# Patient Record
Sex: MALE | Race: WHITE | NOT HISPANIC OR LATINO | Employment: UNEMPLOYED | URBAN - NONMETROPOLITAN AREA
[De-identification: names, ages, dates, MRNs, and addresses within clinical notes are randomized per-mention and may not be internally consistent; named-entity substitution may affect disease eponyms.]

---

## 2019-09-20 ENCOUNTER — LAB (OUTPATIENT)
Dept: LAB | Facility: HOSPITAL | Age: 59
End: 2019-09-20

## 2019-09-20 ENCOUNTER — APPOINTMENT (OUTPATIENT)
Dept: LAB | Facility: HOSPITAL | Age: 59
End: 2019-09-20

## 2019-09-20 ENCOUNTER — TRANSCRIBE ORDERS (OUTPATIENT)
Dept: ADMINISTRATIVE | Facility: HOSPITAL | Age: 59
End: 2019-09-20

## 2019-09-20 DIAGNOSIS — R30.0 DYSURIA: ICD-10-CM

## 2019-09-20 DIAGNOSIS — R30.0 DYSURIA: Primary | ICD-10-CM

## 2019-09-20 LAB
BILIRUB UR QL STRIP: NEGATIVE
CLARITY UR: CLEAR
COLOR UR: YELLOW
GLUCOSE UR STRIP-MCNC: NEGATIVE MG/DL
HGB UR QL STRIP.AUTO: NEGATIVE
KETONES UR QL STRIP: NEGATIVE
LEUKOCYTE ESTERASE UR QL STRIP.AUTO: NEGATIVE
NITRITE UR QL STRIP: NEGATIVE
PH UR STRIP.AUTO: 6 [PH] (ref 5–8)
PROT UR QL STRIP: NEGATIVE
SP GR UR STRIP: <=1.005 (ref 1–1.03)
UROBILINOGEN UR QL STRIP: NORMAL

## 2019-09-20 PROCEDURE — 81003 URINALYSIS AUTO W/O SCOPE: CPT

## 2020-07-03 ENCOUNTER — LAB (OUTPATIENT)
Dept: LAB | Facility: HOSPITAL | Age: 60
End: 2020-07-03

## 2020-07-03 ENCOUNTER — TRANSCRIBE ORDERS (OUTPATIENT)
Dept: LAB | Facility: HOSPITAL | Age: 60
End: 2020-07-03

## 2020-07-03 DIAGNOSIS — I10 ESSENTIAL (PRIMARY) HYPERTENSION: ICD-10-CM

## 2020-07-03 DIAGNOSIS — I10 ESSENTIAL (PRIMARY) HYPERTENSION: Primary | ICD-10-CM

## 2020-07-03 LAB
ALBUMIN SERPL-MCNC: 3.7 G/DL (ref 3.5–5)
ALBUMIN/GLOB SERPL: 1.2 G/DL (ref 1.1–2.5)
ALP SERPL-CCNC: 83 U/L (ref 24–120)
ALT SERPL W P-5'-P-CCNC: 29 U/L (ref 0–50)
ANION GAP SERPL CALCULATED.3IONS-SCNC: 5 MMOL/L (ref 4–13)
AST SERPL-CCNC: 29 U/L (ref 7–45)
AUTO MIXED CELLS #: 0.9 10*3/MM3 (ref 0.1–2.6)
AUTO MIXED CELLS %: 14.6 % (ref 0.1–24)
BILIRUB SERPL-MCNC: 0.4 MG/DL (ref 0.1–1)
BUN SERPL-MCNC: 7 MG/DL (ref 5–21)
BUN/CREAT SERPL: 8
CALCIUM SPEC-SCNC: 8.9 MG/DL (ref 8.4–10.4)
CHLORIDE SERPL-SCNC: 103 MMOL/L (ref 98–110)
CHOLEST SERPL-MCNC: 119 MG/DL (ref 130–200)
CO2 SERPL-SCNC: 30 MMOL/L (ref 24–31)
CREAT SERPL-MCNC: 0.87 MG/DL (ref 0.5–1.4)
ERYTHROCYTE [DISTWIDTH] IN BLOOD BY AUTOMATED COUNT: 12.9 % (ref 12.3–15.4)
GFR SERPL CREATININE-BSD FRML MDRD: 90 ML/MIN/1.73
GLOBULIN UR ELPH-MCNC: 3.1 GM/DL
GLUCOSE SERPL-MCNC: 107 MG/DL (ref 70–100)
HBA1C MFR BLD: 5.6 % (ref 4.8–5.9)
HCT VFR BLD AUTO: 36.1 % (ref 37.5–51)
HDLC SERPL-MCNC: 35 MG/DL
HGB BLD-MCNC: 12.9 G/DL (ref 13–17.7)
LDLC SERPL CALC-MCNC: 59 MG/DL (ref 0–99)
LDLC/HDLC SERPL: 1.69 {RATIO}
LYMPHOCYTES # BLD AUTO: 2.6 10*3/MM3 (ref 0.7–3.1)
LYMPHOCYTES NFR BLD AUTO: 44.1 % (ref 19.6–45.3)
MCH RBC QN AUTO: 31.7 PG (ref 26.6–33)
MCHC RBC AUTO-ENTMCNC: 35.7 G/DL (ref 31.5–35.7)
MCV RBC AUTO: 88.7 FL (ref 79–97)
NEUTROPHILS NFR BLD AUTO: 2.5 10*3/MM3 (ref 1.7–7)
NEUTROPHILS NFR BLD AUTO: 41.3 % (ref 42.7–76)
PLATELET # BLD AUTO: 270 10*3/MM3 (ref 140–450)
PMV BLD AUTO: 8 FL (ref 6–12)
POTASSIUM SERPL-SCNC: 4.3 MMOL/L (ref 3.5–5.3)
PROT SERPL-MCNC: 6.8 G/DL (ref 6.3–8.7)
PSA SERPL-MCNC: 0.46 NG/ML (ref 0–4)
RBC # BLD AUTO: 4.07 10*6/MM3 (ref 4.14–5.8)
SODIUM SERPL-SCNC: 138 MMOL/L (ref 135–145)
TRIGL SERPL-MCNC: 124 MG/DL (ref 0–149)
TSH SERPL DL<=0.05 MIU/L-ACNC: 1.32 UIU/ML (ref 0.27–4.2)
VLDLC SERPL-MCNC: 24.8 MG/DL
WBC # BLD AUTO: 6 10*3/MM3 (ref 3.4–10.8)

## 2020-07-03 PROCEDURE — G0103 PSA SCREENING: HCPCS | Performed by: NURSE PRACTITIONER

## 2020-07-03 PROCEDURE — 83036 HEMOGLOBIN GLYCOSYLATED A1C: CPT

## 2020-07-03 PROCEDURE — 36415 COLL VENOUS BLD VENIPUNCTURE: CPT

## 2020-07-03 PROCEDURE — 80061 LIPID PANEL: CPT

## 2020-07-03 PROCEDURE — 80050 GENERAL HEALTH PANEL: CPT

## 2020-07-06 ENCOUNTER — TELEPHONE (OUTPATIENT)
Dept: VASCULAR SURGERY | Facility: CLINIC | Age: 60
End: 2020-07-06

## 2020-07-28 RX ORDER — LISINOPRIL 40 MG/1
40 TABLET ORAL EVERY MORNING
COMMUNITY
Start: 2018-10-10 | End: 2021-03-26 | Stop reason: SDUPTHER

## 2020-07-28 RX ORDER — AMANTADINE HYDROCHLORIDE 100 MG/1
CAPSULE, GELATIN COATED ORAL
COMMUNITY
Start: 2020-07-03 | End: 2021-03-26

## 2020-07-28 RX ORDER — PANTOPRAZOLE SODIUM 40 MG/1
40 TABLET, DELAYED RELEASE ORAL EVERY MORNING
COMMUNITY
Start: 2020-07-03

## 2020-07-28 RX ORDER — PROPRANOLOL HYDROCHLORIDE 20 MG/1
20 TABLET ORAL 3 TIMES DAILY
COMMUNITY
Start: 2020-07-03

## 2020-07-28 RX ORDER — BENZTROPINE MESYLATE 2 MG/1
TABLET ORAL
COMMUNITY
End: 2021-03-26

## 2020-07-28 RX ORDER — CHOLECALCIFEROL (VITAMIN D3) 125 MCG
TABLET ORAL
COMMUNITY
End: 2021-03-26

## 2020-07-28 RX ORDER — FLUOXETINE HYDROCHLORIDE 40 MG/1
40 CAPSULE ORAL EVERY MORNING
COMMUNITY
Start: 2020-07-03

## 2020-07-28 RX ORDER — CLONAZEPAM 0.5 MG/1
0.5 TABLET ORAL 2 TIMES DAILY PRN
COMMUNITY
Start: 2020-07-03

## 2020-08-11 NOTE — PROGRESS NOTES
[facility] - PODIATRY    Today's Date: 08/18/20    Patient Name: Ryan Osborn  MRN: [id number]  CSN: [id number]  PCP: Sebastián Monte MD  Referring Provider: Margot Grissom APRN    SUBJECTIVE     Chief Complaint   Patient presents with   • Establish Care     pt c/o long, thickened toenails,  pt stated pain when walking bare foot, denies pain at present time - PCP last visit 7/3/20 non-diabetic      HPI: Ryan Osborn, a 60 y.o.male, comes to clinic as a(n) new patient complaining of thick fungal toenails. Patient has h/o Schizophrenia, Bipolar 1 Disorder, Depression, Anxiety, HTN. Patient is non-diabetic. Patient's brother partially gives history. Recently moved in with his brother to assist with care. Notes that his toenails were very long, thick, discolored and crumbly. Patient has difficulty caring for himself due to psychological issues. Denies pain. Denies previous treatment. Denies any constitutional symptoms. No other pedal complaints at this time.    Past Medical History:   Diagnosis Date   • Anxiety and depression    • Bipolar 1 disorder (CMS/HCC)    • Depression    • Hypertension    • Schizophrenia (CMS/HCC)      Past Surgical History:   Procedure Laterality Date   • APPENDECTOMY     • HERNIA REPAIR       Family History   Problem Relation Age of Onset   • Colon polyps Brother    • Colon cancer Neg Hx    • Esophageal cancer Neg Hx      Social History     Socioeconomic History   • Marital status:      Spouse name: Not on file   • Number of children: Not on file   • Years of education: Not on file   • Highest education level: Not on file   Tobacco Use   • Smoking status: Never Smoker   • Smokeless tobacco: Never Used   Substance and Sexual Activity   • Alcohol use: Never     Frequency: Never   • Drug use: Never     Allergies   Allergen Reactions   • Aripiprazole Other (See Comments)     Caused pacing     Current Outpatient Medications   Medication Sig Dispense Refill   •  amantadine (SYMMETREL) 100 MG capsule      • benztropine (COGENTIN) 2 MG tablet benztropine 2 mg tablet   Take 1 tablet twice a day by oral route.     • clonazePAM (KlonoPIN) 0.5 MG tablet      • FLUoxetine (PROzac) 40 MG capsule      • hydrOXYzine pamoate (VISTARIL) 25 MG capsule hydroxyzine pamoate 25 mg capsule     • lisinopril (PRINIVIL,ZESTRIL) 40 MG tablet Take 1 tablet(s) every day by oral route for 30 days.     • paliperidone (INVEGA) 6 MG 24 hr tablet paliperidone     • pantoprazole (Protonix) 40 MG EC tablet Take 1 tablet every day by oral route in the morning.     • propranolol (INDERAL) 20 MG tablet      • Ergocalciferol (VITAMIN D2) 50 MCG (2000 UT) tablet Take  by mouth.     • terbinafine (lamISIL) 1 % cream Apply  topically to the appropriate area as directed 2 (Two) Times a Day. 36 g 5     No current facility-administered medications for this visit.      Review of Systems   Constitutional: Negative for chills and fever.   HENT: Negative for congestion.    Respiratory: Negative for shortness of breath.    Cardiovascular: Negative for chest pain and leg swelling.   Gastrointestinal: Negative for constipation, diarrhea, nausea and vomiting.   Musculoskeletal:        Foot pain   Skin: Negative for wound.   Neurological: Negative for numbness.       OBJECTIVE     Vitals:    08/18/20 1448   BP: 124/80   Pulse: 62   SpO2: 95%       PHYSICAL EXAM  GEN:   Accompanied by brother.     Foot/Ankle Exam:       General:   Orientation: AAOx3    Gait: unimpaired    Assistance: independent    Shoe Gear:  Casual shoes    VASCULAR      Right Foot Vascularity   Dorsalis pedis:  2+  Posterior tibial:  2+  Skin Temperature: warm    Edema Grading:  None  CFT:  3  Pedal Hair Growth:  Present  Varicosities: none       Left Foot Vascularity   Dorsalis pedis:  2+  Posterior tibial:  2+  Skin Temperature: warm    Edema Grading:  None  CFT:  3  Pedal Hair Growth:  Present  Varicosities: none        NEUROLOGIC     Right Foot  Neurologic   Normal sensation    Light touch sensation:  Normal  Vibratory sensation:  Normal  Hot/Cold sensation: normal    Protective Sensation using Walker-Marlen Monofilament:  10     Left Foot Neurologic   Normal sensation    Light touch sensation:  Normal  Vibratory sensation:  Normal  Hot/cold sensation: normal    Protective Sensation using Walker-Marlen Monofilament:  10     MUSCULOSKELETAL      Right Foot Musculoskeletal   Ecchymosis:  None  Tenderness: toenails    Arch:  Normal     Left Foot Musculoskeletal   Ecchymosis:  None  Tenderness: toenails    Arch:  Normal     MUSCLE STRENGTH     Right Foot Muscle Strength   Foot dorsiflexion:  5  Foot plantar flexion:  5  Foot inversion:  5  Foot eversion:  5     Left Foot Muscle Strength   Foot dorsiflexion:  5  Foot plantar flexion:  5  Foot inversion:  5  Foot eversion:  5     RANGE OF MOTION      Right Foot Range of Motion   Foot and ankle ROM within normal limits       Left Foot Range of Motion   Foot and ankle ROM within normal limits       DERMATOLOGIC     Right Foot Dermatologic   Skin: tinea (moccasin distribution)    Nails: onychomycosis, abnormally thick, subungual debris and dystrophic nails       Left Foot Dermatologic   Skin: tinea (moccasin distribution)    Nails: onychomycosis, abnormally thick, subungual debris and dystrophic nails        RADIOLOGY/NUCLEAR:  No results found.    LABORATORY/CULTURE RESULTS:      PATHOLOGY RESULTS:       ASSESSMENT/PLAN     Ryan was seen today for establish care.    Diagnoses and all orders for this visit:    Onychomycosis    Tinea pedis of both feet    Other orders  -     terbinafine (lamISIL) 1 % cream; Apply  topically to the appropriate area as directed 2 (Two) Times a Day.      Comprehensive lower extremity examination and evaluation was performed.  Discussed findings and treatment plan including risks, benefits, and treatment options with patient in detail. Patient agreed with treatment plan.  After  verbal consent obtained, nail(s) x10 debrided of length and thickness with nail nipper without incidence  Patient may maintain nails and calluses at home utilizing emery board or pumice stone between visits as needed   Apply antifungal BID until reoslution   An After Visit Summary was printed and given to the patient at discharge, including (if requested) any available informative/educational handouts regarding diagnosis, treatment, or medications. All questions were answered to patient/family satisfaction. Should symptoms fail to improve or worsen they agree to call or return to clinic or to go to the Emergency Department. Discussed the importance of following up with any needed screening tests/labs/specialist appointments and any requested follow-up recommended by me today. Importance of maintaining follow-up discussed and patient accepts that missed appointments can delay diagnosis and potentially lead to worsening of conditions.  Return if symptoms worsen or fail to improve., or sooner if acute issues arise.        This document has been electronically signed by Wilmer Mcclain DPM on August 18, 2020 15:18

## 2020-08-13 ENCOUNTER — OFFICE VISIT (OUTPATIENT)
Dept: GASTROENTEROLOGY | Facility: CLINIC | Age: 60
End: 2020-08-13

## 2020-08-13 VITALS
BODY MASS INDEX: 30.61 KG/M2 | HEIGHT: 72 IN | TEMPERATURE: 97 F | OXYGEN SATURATION: 98 % | DIASTOLIC BLOOD PRESSURE: 76 MMHG | WEIGHT: 226 LBS | SYSTOLIC BLOOD PRESSURE: 134 MMHG | HEART RATE: 71 BPM

## 2020-08-13 DIAGNOSIS — R13.10 ODYNOPHAGIA: Primary | ICD-10-CM

## 2020-08-13 PROCEDURE — 99204 OFFICE O/P NEW MOD 45 MIN: CPT | Performed by: INTERNAL MEDICINE

## 2020-08-13 RX ORDER — PALIPERIDONE 6 MG/1
6 TABLET, EXTENDED RELEASE ORAL EVERY MORNING
COMMUNITY

## 2020-08-13 NOTE — PROGRESS NOTES
Chief Complaint   Patient presents with   • GI Problem     when he eats he gets choked       PCP: Sebastián Monte MD  REFER: Margot Grissom APRN    Subjective     HPI    He will often cough while eating.  He feels discomfort when swallowing.  He notices phlegm.    No weight  Loss, reports as weight  gain.  No bowels changes.  No heartburn or indigestion.  Started on protonix by PCP.  Unsure if this has provided relief.  Colonoscopy by dr Wicho Carranza 2017.  Consumes large amount of caffeine and chocolate milk.    History reviewed. No pertinent past medical history.    Past Surgical History:   Procedure Laterality Date   • APPENDECTOMY     • HERNIA REPAIR         Outpatient Medications Marked as Taking for the 8/13/20 encounter (Office Visit) with Xavier Stone, DO   Medication Sig Dispense Refill   • amantadine (SYMMETREL) 100 MG capsule      • clonazePAM (KlonoPIN) 0.5 MG tablet      • FLUoxetine (PROzac) 40 MG capsule      • lisinopril (PRINIVIL,ZESTRIL) 40 MG tablet Take 1 tablet(s) every day by oral route for 30 days.     • paliperidone (INVEGA) 6 MG 24 hr tablet paliperidone     • pantoprazole (Protonix) 40 MG EC tablet Take 1 tablet every day by oral route in the morning.     • propranolol (INDERAL) 20 MG tablet          Allergies   Allergen Reactions   • Aripiprazole Other (See Comments)     Caused pacing       Social History     Socioeconomic History   • Marital status:      Spouse name: Not on file   • Number of children: Not on file   • Years of education: Not on file   • Highest education level: Not on file   Tobacco Use   • Smoking status: Never Smoker   • Smokeless tobacco: Never Used   Substance and Sexual Activity   • Alcohol use: Never     Frequency: Never   • Drug use: Never       Family History   Problem Relation Age of Onset   • Colon polyps Brother    • Colon cancer Neg Hx    • Esophageal cancer Neg Hx        Review of Systems   Constitutional: Negative for fatigue, fever and  "unexpected weight change.   HENT: Positive for trouble swallowing. Negative for hearing loss, sore throat and voice change.    Eyes: Negative for visual disturbance.   Respiratory: Negative for cough, shortness of breath and wheezing.    Cardiovascular: Negative for chest pain and palpitations.   Gastrointestinal: Negative for abdominal pain, blood in stool and vomiting.   Endocrine: Negative for polydipsia and polyuria.   Genitourinary: Negative for difficulty urinating, dysuria, hematuria and urgency.   Musculoskeletal: Negative for joint swelling and myalgias.   Skin: Negative for color change, rash and wound.   Neurological: Negative for dizziness, tremors, seizures and syncope.   Hematological: Does not bruise/bleed easily.   Psychiatric/Behavioral: Negative for agitation and confusion. The patient is not nervous/anxious.        Objective     Vitals:    08/13/20 1420   BP: 134/76   Pulse: 71   Temp: 97 °F (36.1 °C)   SpO2: 98%   Weight: 103 kg (226 lb)   Height: 182.9 cm (72\")     Body mass index is 30.65 kg/m².    Physical Exam   Constitutional: He is oriented to person, place, and time. He appears well-developed and well-nourished. He is cooperative.   HENT:   Head: Normocephalic and atraumatic.   Eyes: Pupils are equal, round, and reactive to light. Conjunctivae are normal. No scleral icterus.   Neck: Normal range of motion. Neck supple. No JVD present. No thyroid mass and no thyromegaly present.   Cardiovascular: Normal rate, regular rhythm and normal heart sounds. Exam reveals no gallop and no friction rub.   No murmur heard.  Pulmonary/Chest: Effort normal and breath sounds normal. No accessory muscle usage. No respiratory distress. He has no wheezes. He has no rales.   Abdominal: Soft. Normal appearance and bowel sounds are normal. He exhibits no distension, no ascites and no mass. There is no hepatosplenomegaly. There is no tenderness. There is no rebound and no guarding.   Musculoskeletal: Normal range " of motion. He exhibits no edema or tenderness.     Vascular Status -  His right foot exhibits normal foot vasculature  and no edema. His left foot exhibits normal foot vasculature  and no edema.  Lymphadenopathy:     He has no cervical adenopathy.   Neurological: He is alert and oriented to person, place, and time. He has normal strength. Gait normal.   Skin: Skin is warm, dry and intact. No rash noted.       Imaging Results (Most Recent)     None          Body mass index is 30.65 kg/m².    Assessment/Plan     Ryan was seen today for gi problem.    Diagnoses and all orders for this visit:    Odynophagia  -     Case Request; Standing  -     Case Request        ESOPHAGOGASTRODUODENOSCOPY WITH ANESTHESIA (N/A)    Cautioned against caffeine consumption    Precautions are currently being put in place due to COVID-19.  I have explained to Ryan Osborn they will be required to undergo COVID testing prior to their procedure.  Ryan Osborn verbalized understanding and was willing to proceed.    Patient's Body mass index is 30.65 kg/m². BMI is above normal parameters. Recommendations include: no follow up.       Xavier Stone DO  08/13/20          There are no Patient Instructions on file for this visit.

## 2020-08-17 ENCOUNTER — TELEPHONE (OUTPATIENT)
Dept: PODIATRY | Facility: CLINIC | Age: 60
End: 2020-08-17

## 2020-08-18 ENCOUNTER — OFFICE VISIT (OUTPATIENT)
Dept: PODIATRY | Facility: CLINIC | Age: 60
End: 2020-08-18

## 2020-08-18 VITALS
WEIGHT: 224 LBS | BODY MASS INDEX: 30.34 KG/M2 | DIASTOLIC BLOOD PRESSURE: 80 MMHG | SYSTOLIC BLOOD PRESSURE: 124 MMHG | HEIGHT: 72 IN | HEART RATE: 62 BPM | OXYGEN SATURATION: 95 %

## 2020-08-18 DIAGNOSIS — B35.3 TINEA PEDIS OF BOTH FEET: ICD-10-CM

## 2020-08-18 DIAGNOSIS — B35.1 ONYCHOMYCOSIS: Primary | ICD-10-CM

## 2020-08-18 PROCEDURE — 11721 DEBRIDE NAIL 6 OR MORE: CPT | Performed by: PODIATRIST

## 2020-08-18 PROCEDURE — 99203 OFFICE O/P NEW LOW 30 MIN: CPT | Performed by: PODIATRIST

## 2020-08-18 RX ORDER — HYDROXYZINE PAMOATE 25 MG/1
CAPSULE ORAL
COMMUNITY
End: 2021-03-26

## 2020-08-20 ENCOUNTER — TRANSCRIBE ORDERS (OUTPATIENT)
Dept: ADMINISTRATIVE | Facility: HOSPITAL | Age: 60
End: 2020-08-20

## 2020-08-20 DIAGNOSIS — Z01.818 PRE-OP TESTING: Primary | ICD-10-CM

## 2020-08-27 ENCOUNTER — TELEPHONE (OUTPATIENT)
Dept: GASTROENTEROLOGY | Facility: CLINIC | Age: 60
End: 2020-08-27

## 2020-08-27 NOTE — TELEPHONE ENCOUNTER
Patient did not get COVID testing done. Reschedule for 09/04/2020 at 6:45am     Spoke with patient and his brother and they are now both aware that he must get tested for Covid19 before the procedure.     Thank you

## 2020-09-01 ENCOUNTER — LAB (OUTPATIENT)
Dept: LAB | Facility: HOSPITAL | Age: 60
End: 2020-09-01

## 2020-09-01 PROCEDURE — C9803 HOPD COVID-19 SPEC COLLECT: HCPCS | Performed by: INTERNAL MEDICINE

## 2020-09-01 PROCEDURE — U0003 INFECTIOUS AGENT DETECTION BY NUCLEIC ACID (DNA OR RNA); SEVERE ACUTE RESPIRATORY SYNDROME CORONAVIRUS 2 (SARS-COV-2) (CORONAVIRUS DISEASE [COVID-19]), AMPLIFIED PROBE TECHNIQUE, MAKING USE OF HIGH THROUGHPUT TECHNOLOGIES AS DESCRIBED BY CMS-2020-01-R: HCPCS | Performed by: INTERNAL MEDICINE

## 2020-09-02 LAB
COVID LABCORP PRIORITY: NORMAL
SARS-COV-2 RNA RESP QL NAA+PROBE: NOT DETECTED

## 2020-09-04 ENCOUNTER — HOSPITAL ENCOUNTER (OUTPATIENT)
Facility: HOSPITAL | Age: 60
Setting detail: HOSPITAL OUTPATIENT SURGERY
Discharge: HOME | End: 2020-09-04
Attending: INTERNAL MEDICINE | Admitting: INTERNAL MEDICINE

## 2020-09-04 ENCOUNTER — ANESTHESIA (OUTPATIENT)
Dept: GASTROENTEROLOGY | Facility: HOSPITAL | Age: 60
End: 2020-09-04

## 2020-09-04 ENCOUNTER — ANESTHESIA EVENT (OUTPATIENT)
Dept: GASTROENTEROLOGY | Facility: HOSPITAL | Age: 60
End: 2020-09-04

## 2020-09-04 VITALS
HEART RATE: 60 BPM | RESPIRATION RATE: 14 BRPM | HEIGHT: 75 IN | BODY MASS INDEX: 27.85 KG/M2 | SYSTOLIC BLOOD PRESSURE: 154 MMHG | OXYGEN SATURATION: 97 % | DIASTOLIC BLOOD PRESSURE: 99 MMHG | TEMPERATURE: 98.2 F | WEIGHT: 224 LBS

## 2020-09-04 DIAGNOSIS — R13.10 ODYNOPHAGIA: ICD-10-CM

## 2020-09-04 PROCEDURE — 25010000002 PROPOFOL 10 MG/ML EMULSION: Performed by: NURSE ANESTHETIST, CERTIFIED REGISTERED

## 2020-09-04 PROCEDURE — 43239 EGD BIOPSY SINGLE/MULTIPLE: CPT | Performed by: INTERNAL MEDICINE

## 2020-09-04 PROCEDURE — 87081 CULTURE SCREEN ONLY: CPT | Performed by: INTERNAL MEDICINE

## 2020-09-04 RX ORDER — SODIUM CHLORIDE 9 MG/ML
500 INJECTION, SOLUTION INTRAVENOUS CONTINUOUS PRN
Status: DISCONTINUED | OUTPATIENT
Start: 2020-09-04 | End: 2020-09-04 | Stop reason: HOSPADM

## 2020-09-04 RX ORDER — LIDOCAINE HYDROCHLORIDE 10 MG/ML
0.5 INJECTION, SOLUTION EPIDURAL; INFILTRATION; INTRACAUDAL; PERINEURAL ONCE AS NEEDED
Status: DISCONTINUED | OUTPATIENT
Start: 2020-09-04 | End: 2020-09-04 | Stop reason: HOSPADM

## 2020-09-04 RX ORDER — SODIUM CHLORIDE 0.9 % (FLUSH) 0.9 %
10 SYRINGE (ML) INJECTION AS NEEDED
Status: DISCONTINUED | OUTPATIENT
Start: 2020-09-04 | End: 2020-09-04 | Stop reason: HOSPADM

## 2020-09-04 RX ORDER — PROPOFOL 10 MG/ML
VIAL (ML) INTRAVENOUS AS NEEDED
Status: DISCONTINUED | OUTPATIENT
Start: 2020-09-04 | End: 2020-09-04 | Stop reason: SURG

## 2020-09-04 RX ADMIN — PROPOFOL 30 MG: 10 INJECTION, EMULSION INTRAVENOUS at 08:23

## 2020-09-04 RX ADMIN — SODIUM CHLORIDE 500 ML: 9 INJECTION, SOLUTION INTRAVENOUS at 07:53

## 2020-09-04 RX ADMIN — LIDOCAINE HYDROCHLORIDE 100 MG: 20 INJECTION, SOLUTION INTRAVENOUS at 08:18

## 2020-09-04 RX ADMIN — PROPOFOL 30 MG: 10 INJECTION, EMULSION INTRAVENOUS at 08:19

## 2020-09-04 RX ADMIN — PROPOFOL 70 MG: 10 INJECTION, EMULSION INTRAVENOUS at 08:18

## 2020-09-04 RX ADMIN — PROPOFOL 30 MG: 10 INJECTION, EMULSION INTRAVENOUS at 08:21

## 2020-09-04 NOTE — ANESTHESIA POSTPROCEDURE EVALUATION
Patient: Ryan Osborn    Procedure Summary     Date:  09/04/20 Room / Location:  [facility] ENDOSCOPY 5 / [facility]    Anesthesia Start:  0816 Anesthesia Stop:  0827    Procedure:  ESOPHAGOGASTRODUODENOSCOPY WITH ANESTHESIA (N/A ) Diagnosis:       Odynophagia      (Odynophagia [R13.10])    Surgeon:  Xavier Stone DO Provider:  Rfaael Navarro CRNA    Anesthesia Type:  MAC ASA Status:  2          Anesthesia Type: MAC    Vitals  Vitals Value Taken Time   BP     Temp     Pulse 72 9/4/2020  8:28 AM   Resp     SpO2 95 % 9/4/2020  8:28 AM   Vitals shown include unvalidated device data.        Post Anesthesia Care and Evaluation    Patient location during evaluation: PHASE II  Patient participation: complete - patient participated  Level of consciousness: awake  Pain score: 0  Pain management: adequate  Airway patency: patent  Anesthetic complications: No anesthetic complications  PONV Status: none  Cardiovascular status: acceptable  Respiratory status: acceptable  Hydration status: acceptable

## 2020-09-04 NOTE — ANESTHESIA PREPROCEDURE EVALUATION
Anesthesia Evaluation     Patient summary reviewed   no history of anesthetic complications:  NPO Solid Status: > 8 hours             Airway   Mallampati: II  TM distance: >3 FB  Neck ROM: full  Dental      Pulmonary    (+) sleep apnea on CPAP,   Cardiovascular   Exercise tolerance: excellent (>7 METS)    (+) hypertension,       Neuro/Psych  (+) psychiatric history Schizophrenia,     GI/Hepatic/Renal/Endo    (+)  GERD,      Musculoskeletal     Abdominal    Substance History      OB/GYN          Other                        Anesthesia Plan    ASA 2     MAC       Anesthetic plan, all risks, benefits, and alternatives have been provided, discussed and informed consent has been obtained with: patient.

## 2020-09-05 LAB — UREASE TISS QL: NEGATIVE

## 2020-10-20 ENCOUNTER — OFFICE VISIT (OUTPATIENT)
Dept: GASTROENTEROLOGY | Facility: CLINIC | Age: 60
End: 2020-10-20

## 2020-10-20 VITALS
SYSTOLIC BLOOD PRESSURE: 136 MMHG | OXYGEN SATURATION: 98 % | HEIGHT: 72 IN | DIASTOLIC BLOOD PRESSURE: 80 MMHG | HEART RATE: 70 BPM | WEIGHT: 227 LBS | TEMPERATURE: 98.2 F | BODY MASS INDEX: 30.75 KG/M2

## 2020-10-20 DIAGNOSIS — K21.9 GASTROESOPHAGEAL REFLUX DISEASE WITHOUT ESOPHAGITIS: ICD-10-CM

## 2020-10-20 DIAGNOSIS — R05.9 COUGH IN ADULT PATIENT: Primary | ICD-10-CM

## 2020-10-20 PROCEDURE — 99213 OFFICE O/P EST LOW 20 MIN: CPT | Performed by: INTERNAL MEDICINE

## 2020-10-20 NOTE — PROGRESS NOTES
Chief Complaint   Patient presents with   • GI Problem     coughs when he drinks liquids       PCP: Sebastián Monte MD  REFER: No ref. provider found    Subjective     HPI    Since taking PO Protonix doing significantly better/but family member that's with him isn't totally convinced , brother who is with him still thinks he has a problem with coughing after drinking      Past Medical History:   Diagnosis Date   • Anxiety and depression    • Bipolar 1 disorder (CMS/HCC)    • Depression    • Hypertension    • Schizophrenia (CMS/HCC)        Past Surgical History:   Procedure Laterality Date   • APPENDECTOMY     • ENDOSCOPY N/A 9/4/2020    Procedure: ESOPHAGOGASTRODUODENOSCOPY WITH ANESTHESIA;  Surgeon: Xavier Stone DO;  Location: [facility] ENDOSCOPY;  Service: Gastroenterology;  Laterality: N/A;  pre: painful swallowing  post; esophagitis  Sebastián Monte MD   • HERNIA REPAIR         Outpatient Medications Marked as Taking for the 10/20/20 encounter (Office Visit) with Xavier Stone DO   Medication Sig Dispense Refill   • amantadine (SYMMETREL) 100 MG capsule      • benztropine (COGENTIN) 2 MG tablet benztropine 2 mg tablet   Take 1 tablet twice a day by oral route.     • clonazePAM (KlonoPIN) 0.5 MG tablet      • Ergocalciferol (VITAMIN D2) 50 MCG (2000 UT) tablet Take  by mouth.     • FLUoxetine (PROzac) 40 MG capsule      • hydrOXYzine pamoate (VISTARIL) 25 MG capsule hydroxyzine pamoate 25 mg capsule     • lisinopril (PRINIVIL,ZESTRIL) 40 MG tablet Take 1 tablet(s) every day by oral route for 30 days.     • paliperidone (INVEGA) 6 MG 24 hr tablet paliperidone     • pantoprazole (Protonix) 40 MG EC tablet Take 1 tablet every day by oral route in the morning.     • propranolol (INDERAL) 20 MG tablet      • terbinafine (lamISIL) 1 % cream Apply  topically to the appropriate area as directed 2 (Two) Times a Day. 36 g 5       Allergies   Allergen Reactions   • Aripiprazole Other (See Comments)     Caused pacing  "      Social History     Socioeconomic History   • Marital status:      Spouse name: Not on file   • Number of children: Not on file   • Years of education: Not on file   • Highest education level: Not on file   Tobacco Use   • Smoking status: Never Smoker   • Smokeless tobacco: Never Used   Substance and Sexual Activity   • Alcohol use: Never     Frequency: Never   • Drug use: Never       Family History   Problem Relation Age of Onset   • Colon polyps Brother    • Colon cancer Neg Hx    • Esophageal cancer Neg Hx        Review of Systems   Constitutional: Negative for fatigue, fever and unexpected weight change.   HENT: Negative for hearing loss, sore throat and voice change.    Eyes: Negative for visual disturbance.   Respiratory: Negative for cough, shortness of breath and wheezing.    Cardiovascular: Negative for chest pain and palpitations.   Gastrointestinal: Negative for abdominal pain, blood in stool and vomiting.   Endocrine: Negative for polydipsia and polyuria.   Genitourinary: Negative for difficulty urinating, dysuria, hematuria and urgency.   Musculoskeletal: Negative for joint swelling and myalgias.   Skin: Negative for color change, rash and wound.   Neurological: Negative for dizziness, tremors, seizures and syncope.   Hematological: Does not bruise/bleed easily.   Psychiatric/Behavioral: Negative for agitation and confusion. The patient is not nervous/anxious.        Objective     Vitals:    10/20/20 1251   BP: 136/80   Pulse: 70   Temp: 98.2 °F (36.8 °C)   SpO2: 98%   Weight: 103 kg (227 lb)   Height: 182.9 cm (72\")     Body mass index is 30.79 kg/m².    Physical Exam  Constitutional:       Appearance: He is well-developed.   HENT:      Head: Normocephalic and atraumatic.   Eyes:      Comments: Pink, Nonicteric   Neck:      Comments: Global Assessment- supple. No JVD or lymphadenopathy  Cardiovascular:      Rate and Rhythm: Normal rate and regular rhythm.      Heart sounds: Normal heart " sounds. No murmur. No friction rub. No gallop.    Pulmonary:      Effort: Pulmonary effort is normal. No respiratory distress.      Breath sounds: Normal breath sounds. No wheezing or rales.   Abdominal:      General: Bowel sounds are normal. There is no distension.      Palpations: Abdomen is soft. There is no mass.      Tenderness: There is no abdominal tenderness. There is no guarding or rebound.   Neurological:      Mental Status: He is alert and oriented to person, place, and time.      Comments: General Exam-Deemed a reliable historian, able to converse without difficulty and Able to move all extremities without difficulty         Imaging Results (Most Recent)     None          Body mass index is 30.79 kg/m².    Assessment/Plan     Diagnoses and all orders for this visit:    1. Cough in adult patient (Primary)  -     Ambulatory Referral to Speech Therapy    2. Gastroesophageal reflux disease without esophagitis        * Surgery not found *        Precautions are currently being put in place due to COVID-19.  I have explained to Ryan Osborn they will be required to undergo COVID testing prior to their procedure.  Ryan Osborn verbalized understanding and was willing to proceed.    Patient's Body mass index is 30.79 kg/m². BMI is within normal parameters. No follow-up required..       Xavier Stone, DO  10/20/20          There are no Patient Instructions on file for this visit.

## 2020-10-26 ENCOUNTER — TELEPHONE (OUTPATIENT)
Dept: GASTROENTEROLOGY | Facility: CLINIC | Age: 60
End: 2020-10-26

## 2020-10-26 DIAGNOSIS — R05.9 COUGH IN ADULT PATIENT: Primary | ICD-10-CM

## 2020-11-03 ENCOUNTER — OFFICE VISIT (OUTPATIENT)
Dept: PHYSICAL THERAPY | Facility: CLINIC | Age: 60
End: 2020-11-03

## 2020-11-03 DIAGNOSIS — R13.10 DYSPHAGIA, UNSPECIFIED TYPE: Primary | ICD-10-CM

## 2020-11-03 PROCEDURE — 92610 EVALUATE SWALLOWING FUNCTION: CPT | Performed by: SPEECH-LANGUAGE PATHOLOGIST

## 2020-11-03 NOTE — PROGRESS NOTES
Outpatient Speech Language Pathology   Adult Swallow Initial Evaluation       Patient Name: Ryan Osborn  : 1960  MRN: [id number]  Today's Date: 11/3/2020         Visit Date: 2020   Patient Active Problem List   Diagnosis   • Odynophagia        Past Medical History:   Diagnosis Date   • Anxiety and depression    • Bipolar 1 disorder (CMS/HCC)    • Depression    • Hypertension    • Schizophrenia (CMS/HCC)         Past Surgical History:   Procedure Laterality Date   • APPENDECTOMY     • ENDOSCOPY N/A 2020    Procedure: ESOPHAGOGASTRODUODENOSCOPY WITH ANESTHESIA;  Surgeon: Xavier Stone DO;  Location: [facility] ENDOSCOPY;  Service: Gastroenterology;  Laterality: N/A;  pre: painful swallowing  post; esophagitis  Sebastián Monte MD   • HERNIA REPAIR           Visit Dx:     ICD-10-CM ICD-9-CM   1. Dysphagia, unspecified type  R13.10 787.20       SPEECH-LANGUAGE PATHOLOGY EVALUATION - SWALLOW  Subjective: The patient was seen on this date for a Clinical Swallow evaluation.  Patient was alert and cooperative.  Significant history: GERD, c/o coughing with drinking liquids with meals. Poor dentition and poor oral care.   Objective: Textures given included thin liquid and regular consistency.  Assessment:  Patient presented with trials of thin water, cracker, and 3oz water swallow test ([place] swallow screen). Patient had no overt s/s of aspiration. SLP cannot r/o silent aspiration without instrumental evaluation. Patient has poor dentition and poor oral health. He stated that his teeth do not meet so he cannot chew. He plans to have his teeth pulled and get dentures. He stated that he has to drink water to make his food go down due to not being able to chew. This may be contributing to his coughing with drinking during meals. VFSS recommended to r/o aspiration and determine swallow physiology  SLP Findings:  Patient presents with suspected oropharyngeal dysphagia, with esophageal component.    Recommendations: Diet Textures: thin liquid, mechanical soft consistency food.  Medications should be taken as tolerated.   Recommended Strategies: Upright for PO, small bites and sips and alternate liquids and solids. Oral care before breakfast, after all meals and PRN. Follow up with dental professional.   Other Recommended Evaluations: VFSS    Dysphagia therapy pending results of VFSS.            Adult Dysphagia - 11/03/20 1500        Adult Dysphagia Background    Patient Description of Complaint  Trouble swallowing liquids.   -KG    Date of Onset   At least a year.    -KG    Symptoms Reported by Patient  Difficulty swallowing liquids   -KG    Patient Report of Functional Impact  Coughs with meals   -KG    List Pertinent Medications  see chart   -KG    History Pertinent to Diagnosis  GERD   -KG    Current Diet (Solids)  Mechanical Soft    due to dentition  -KG    Diet Level (Liquids)  Thin Liquid   -KG       Oral Mech    Position During Evaluation  Upright   -KG    Anatomy/Physiology WNL  Patient demonstrates anatomy that is WNL   -KG    Dentition  Patient is partially edentulous   -KG    Oral Health  Poor oral health    awaiting dental visit to pull remaining teeth.   -KG    Awareness/Control of Secretions  Patient swallows saliva   -KG       Foods/Liquids Presented    Method of Administration  Cup;Self-fed   -KG       Cup    Consistency  Thin   -KG    Amount  3oz water swallow plus sips   -KG    Response  No Signs/Symptoms   -KG       Self-Fed    Consistency  Soft Solid   -KG    Amount  1 bite cracker   -KG    Response  Oral Residue;Other (comment)    due to dentition, cannot chew  -KG    Strategies Attempted  Other (comment)    liquid wash  -KG    Response to Strategies  Successful   -KG       Oral Phase Impaired Physiology Observed    Oral Phase  X   -KG    Compensations  Attempted (comment)    cannot chew well due to dentition  -KG    Pharyngeal Symptoms  None observed   -KG    Summary Comments of Clinical  Exam  Patient presented with trials of thin water, cracker, and 3oz water swallow test ([place] swallow screen). Patient had no overt s/s of aspiration. SLP cannot r/o silent aspiration without instrumental evaluation. Patient has poor dentition and poor oral health. He stated that his teeth do not meet so he cannot chew. He plans to have his teeth pulled and get dentures. He stated that he has to drink water to make his food go down due to not being able to chew. This may be contributing to his coughing with drinking during meals. VFSS recommended to r/o aspiration and determine swallow physiology. Follow up with dentistry.    -KG       Results/Recs/Barriers/Education for Dysphagia    Factors Affecting Performance  other    poor dentition  -KG    Learning Motivation  moderate   -KG    Education/Learning Comments  Completed education with patient and family (sister in law).    -KG    Clinical Impression: Swallowing  other dysphagia   -KG    Impact on Function  risk for aspiration;risk for pneumonia   -KG    Recommendations for Diet/Nutirition  aggressive oral care;full oral diet   -KG    Swallowing Precautions  reduce distractions;alternate liquids and solids while eating   -KG    Recommendations  Videofluoroscopic Swallowing Study (VFSS)   -KG      User Key  (r) = Recorded By, (t) = Taken By, (c) = Cosigned By    Initials Name Provider Type    KG Yuni Castro CCC-SLP Speech and Language Pathologist                      OP SLP Education     Row Name 11/03/20 1500       Education    Barriers to Learning  Decreased comprehension  -KG    Action Taken to Address Barriers  Education with family present (sister in law).   -KG    Education Provided  Described results of evaluation;Patient expressed understanding of evaluation;Family/caregivers expressed understanding of evaluation;Patient participated in establishing goals and treatment plan;Family/caregivers participated in establishing goals and treatment plan;Patient  requires further education on strategies, risks  -KG    Assessed  Learning needs;Learning motivation;Learning preferences;Learning readiness  -KG    Learning Motivation  Moderate  -KG    Learning Method  Explanation  -KG    Teaching Response  Verbalized understanding;Reinforcement needed  -KG      User Key  (r) = Recorded By, (t) = Taken By, (c) = Cosigned By    Initials Name Effective Dates    Yuni Prakash CCC-SLP 02/11/20 -           SLP OP Goals     Row Name 11/03/20 1500          Subjective Comments    Subjective Comments  Initial clinical evaluation today. VFSS warranted. Will add goals if therapy is warranted.   -KG       User Key  (r) = Recorded By, (t) = Taken By, (c) = Cosigned By    Initials Name Provider Type    Yuni Prakash CCCSLP Speech and Language Pathologist          OP SLP Assessment/Plan - 11/03/20 1500        SLP Assessment    Functional Problems  Swallowing   -KG    Impact on Function: Swallowing  Risk of aspiration;Risk of pneumonia;Impact on social aspects of eating   -KG    Clinical Impression: Swallowing  other dysphagia   -KG    Functional Problems Comment  VFSS needed to determine swallow physiology.    -KG    SLP Diagnosis  Other dysphagia   -KG    Prognosis  Good (comment)   -KG    Patient/caregiver participated in establishment of treatment plan and goals  Yes   -KG    Patient would benefit from skilled therapy intervention  --    Pending VFSS  -KG       SLP Plan    Frequency  PRN Pending VFSS   -KG    Planned CPT's?  SLP MBS: 80753;SLP SWALLOW THERAPY: 40203   -KG    Plan Comments  Patient to have VFSS. Call for therapy sessions if warranted.    -KG      User Key  (r) = Recorded By, (t) = Taken By, (c) = Cosigned By    Initials Name Provider Type    Yuni Prakash CCC-SLP Speech and Language Pathologist                 TYLER Marr  11/3/2020

## 2020-11-05 ENCOUNTER — TRANSCRIBE ORDERS (OUTPATIENT)
Dept: ADMINISTRATIVE | Facility: HOSPITAL | Age: 60
End: 2020-11-05

## 2020-11-05 DIAGNOSIS — Z01.818 PREOP TESTING: Primary | ICD-10-CM

## 2020-11-09 ENCOUNTER — TRANSCRIBE ORDERS (OUTPATIENT)
Dept: ADMINISTRATIVE | Facility: HOSPITAL | Age: 60
End: 2020-11-09

## 2020-11-09 ENCOUNTER — LAB (OUTPATIENT)
Dept: LAB | Facility: HOSPITAL | Age: 60
End: 2020-11-09

## 2020-11-09 DIAGNOSIS — Z01.818 PRE-OP TESTING: Primary | ICD-10-CM

## 2020-11-09 LAB — SARS-COV-2 RNA PNL SPEC NAA+PROBE: NOT DETECTED

## 2020-11-09 PROCEDURE — C9803 HOPD COVID-19 SPEC COLLECT: HCPCS | Performed by: NURSE PRACTITIONER

## 2020-11-09 PROCEDURE — 87635 SARS-COV-2 COVID-19 AMP PRB: CPT | Performed by: NURSE PRACTITIONER

## 2020-11-10 ENCOUNTER — HOSPITAL ENCOUNTER (OUTPATIENT)
Dept: GENERAL RADIOLOGY | Facility: HOSPITAL | Age: 60
Discharge: HOME | End: 2020-11-10
Admitting: NURSE PRACTITIONER

## 2020-11-10 DIAGNOSIS — R05.9 COUGH IN ADULT PATIENT: ICD-10-CM

## 2020-11-10 PROCEDURE — 74230 X-RAY XM SWLNG FUNCJ C+: CPT

## 2020-11-10 PROCEDURE — 92611 MOTION FLUOROSCOPY/SWALLOW: CPT

## 2020-11-10 RX ADMIN — BARIUM SULFATE 250 ML: 400 SUSPENSION ORAL at 12:00

## 2020-11-10 RX ADMIN — BARIUM SULFATE 55 ML: 0.81 POWDER, FOR SUSPENSION ORAL at 12:00

## 2020-11-10 RX ADMIN — BARIUM SULFATE 10 ML: 400 PASTE ORAL at 12:00

## 2020-11-10 NOTE — MBS/VFSS/FEES
Speech Language Pathology   [facility] [place] FEES / Discharge Summary  [facility]       Patient Name: Ryan Osborn  : 1960  MRN: [id number]    Today's Date: 11/10/2020      Visit Date: 11/10/2020   SPEECH-LANGUAGE PATHOLOGY EVALUTION - VFSS  Subjective: The patient was seen on this date for a VFSS(Videofluoroscopic Swallowing Study).  Patient was alert and cooperative.    Significant history: HTN, complains of coughing with liquids.  Objective: Risks/benefits were reviewed with the patient and family, and consent was obtained. The study was completed with SLP and Radiologist present. The patient was seen in lateral view(s). Textures given included thin liquid, nectar thick liquid, honey thick liquid, puree consistency and mechanical soft consistency.  Assessment: Difficulties were noted with thin liquid, nectar thick liquid, honey thick liquid, puree consistency and mechanical soft consistency, characterized by premature loss to the point of the vallecula with mechanical soft, pudding, and honey thick consistencies as well as moderate spillage to the pyriform sinuses with nectar thick and thin barium. Premature spillage is noted to be due to decreased back and base of tongue strength as well as delay in swallow initiation. No regular solids were presented as the pt reported he eats soft foods only. Pt's hyolaryngeal excursion, superior laryngeal elevation, posterior pharyngeal wall stripping, and epiglottic inversion is decreased by at least a moderate amount throughout the study. Epiglottic inversion is inconsistent as essentially no inversion was noted with initial trial of honey thick liquids, nectar thick consistency, and with 2x trial of thin liquids. He experienced silent penetration into the laryngeal vestibule with initial trial of thin liquids. He was instructed to complete a second trial of thin liquids utilizing a chin tuck maneuver; however, this was not beneficial as penetration actually increased. SLP  notes no definite aspiration into the airway was observed. Mild vestibule residue was note following final thin liquid trial and he did have a delayed cough post post completion. He is noted to have moderate residue within the vallecula following initial trial of honey thick consistencies which did decreased with a spontaneous subsequent swallow. Mild residue within the vallecula with all other consistencies.     SLP Findings: Patient presents with mild to moderate oropharyngeal dysphagia.     Recommendations: Diet Textures: thin liquid, mechanical soft consistency food.   Recommended Strategies: Upright for PO and small bites and sips. Oral care before breakfast, after all meals and PRN. Occasional throat clear or cough to protect airway.  Other Recommended Evaluations: VFSS    Dysphagia therapy is recommended. Rationale: rehabilitate deconditioned swallow and reduce the risk of aspiration.   Saroj Perla MS CCC-SLP 11/10/2020 13:57 CST        Visit Dx:     ICD-10-CM ICD-9-CM   1. Cough in adult patient  R05 786.2       Patient Active Problem List   Diagnosis   • Odynophagia        Past Medical History:   Diagnosis Date   • Anxiety and depression    • Bipolar 1 disorder (CMS/HCC)    • Depression    • Hypertension    • Schizophrenia (CMS/HCC)         Past Surgical History:   Procedure Laterality Date   • APPENDECTOMY     • ENDOSCOPY N/A 9/4/2020    Procedure: ESOPHAGOGASTRODUODENOSCOPY WITH ANESTHESIA;  Surgeon: Xavier Stone DO;  Location: [facility] ENDOSCOPY;  Service: Gastroenterology;  Laterality: N/A;  pre: painful swallowing  post; esophagitis  Sebastián Monte MD   • HERNIA REPAIR                   SLP Adult Swallow Evaluation     Row Name 11/10/20 1202       Rehab Evaluation    Document Type  discharge evaluation/summary  -CS    Subjective Information  no complaints  -CS    Patient Observations  alert;cooperative;agree to therapy  -CS    Patient/Family/Caregiver Comments/Observations  Sister-in-law  -CS     Care Plan Review  evaluation/treatment results reviewed  -CS    Patient Effort  good  -CS       General Information    Patient Profile Reviewed  yes  -CS    Pertinent History Of Current Problem  HTN, comaplins of coughing with meals  -CS    Current Method of Nutrition  regular textures;thin liquids  -CS    Precautions/Limitations, Vision  WFL;for purposes of eval  -CS    Precautions/Limitations, Hearing  WFL;for purposes of eval  -CS    Prior Level of Function-Communication  unknown  -CS    Prior Level of Function-Swallowing  no diet consistency restrictions  -CS    Plans/Goals Discussed with  patient and family  -CS    Barriers to Rehab  none identified  -CS    Patient's Goals for Discharge  eat/drink without coughing/choking  -CS    Family Goals for Discharge  patient able to eat/drink without coughing/choking  -CS       Pain    Additional Documentation  Pain Scale: Numbers Pre/Post-Treatment (Group)  -CS       Pain Scale: Numbers Pre/Post-Treatment    Pretreatment Pain Rating  0/10 - no pain  -CS    Posttreatment Pain Rating  0/10 - no pain  -CS       Oral Motor Structure and Function    Dentition Assessment  missing teeth;teeth are in poor condition  -CS    Secretion Management  WNL/WFL  -CS    Mucosal Quality  moist, healthy  -CS    Volitional Swallow  WFL  -CS    Volitional Cough  WFL  -CS       Oral Musculature and Cranial Nerve Assessment    Oral Motor General Assessment  WFL  -CS       MBS/VFSS    Utensils Used  spoon;cup  -CS    Consistencies Trialed  soft textures;pudding thick;honey-thick liquids;nectar/syrup-thick liquids;thin liquids  -CS       MBS/VFSS Interpretation    Oral Prep Phase  impaired oral phase of swallowing  -CS    Oral Transit Phase  impaired  -CS    Oral Residue  WFL  -CS    VFSS Summary  See discharge note  -CS       Oral Preparatory Phase    Oral Preparatory Phase  prolonged manipulation  -CS    Prolonged Manipulation  mechanical soft  -CS       Oral Transit Phase    Impaired Oral  Transit Phase  premature spillage of liquids into pharynx  -CS    Premature Spillage of Liquids into Pharynx  all consistencies tested  -CS       Initiation of Pharyngeal Swallow    Initiation of Pharyngeal Swallow  bolus in valleculae;bolus in pyriform sinuses  -CS    Pharyngeal Phase  impaired pharyngeal phase of swallowing  -CS    Penetration During the Swallow  thin liquids;secondary to delayed swallow initiation or mistiming;secondary to reduced laryngeal elevation;secondary to reduced vestibular closure  -CS    Attempted Compensatory Maneuvers  chin tuck  -CS    Response to Attempted Compensatory Maneuvers  did not prevent penetration  -CS       Clinical Impression    Daily Summary of Progress (SLP)  progress toward functional goals is good  -CS    SLP Swallowing Diagnosis  mild-moderate;oral dysphagia;pharyngeal dysphagia  -CS    Functional Impact  risk of aspiration/pneumonia  -CS    Rehab Potential/Prognosis, Swallowing  good, to achieve stated therapy goals  -CS    Swallow Criteria for Skilled Therapeutic Interventions Met  demonstrates skilled criteria  -CS       Recommendations    Therapy Frequency (Swallow)  evaluation only  -CS    Predicted Duration Therapy Intervention (Days)  until discharge  -CS    SLP Diet Recommendation  regular textures;thin liquids  -CS    Recommended Diagnostics  reassess via VFSS (MBS)  -CS    Recommended Precautions and Strategies  upright posture during/after eating;small bites of food and sips of liquid  -CS    Oral Care Recommendations  Oral Care BID/PRN  -CS    SLP Rec. for Method of Medication Administration  meds whole;with thin liquids;with pudding or applesauce;as tolerated  -CS    Monitor for Signs of Aspiration  yes;notify SLP if any concerns  -CS    Anticipated Discharge Disposition (SLP)  home  -CS      User Key  (r) = Recorded By, (t) = Taken By, (c) = Cosigned By    Initials Name Provider Type    CS Saroj Perla MS CCC-SLP Speech and Language Pathologist                          OP SLP Education     Row Name 11/10/20 1338       Education    Barriers to Learning  No barriers identified  -CS    Education Provided  Described results of evaluation;Family/caregivers expressed understanding of evaluation  -CS    Assessed  Learning needs;Learning motivation;Learning preferences;Learning readiness  -CS    Learning Motivation  Strong  -CS    Learning Method  Explanation  -CS    Teaching Response  Verbalized understanding  -CS    Education Comments  See VFSS/discharge note  -CS      User Key  (r) = Recorded By, (t) = Taken By, (c) = Cosigned By    Initials Name Effective Dates    CS Saroj Perla MS CCC-SLP 04/03/18 -                                       Time Calculation:        Therapy Charges for Today     Code Description Service Date Service Provider Modifiers Qty    60236006348 HC ST MOTION FLUORO EVAL SWALLOW 7 11/10/2020 Saroj Perla MS CCC-SLP GN 1                  Saroj Perla MS CCC-SLP  11/10/2020

## 2020-12-21 ENCOUNTER — TREATMENT (OUTPATIENT)
Dept: PHYSICAL THERAPY | Facility: CLINIC | Age: 60
End: 2020-12-21

## 2020-12-21 DIAGNOSIS — R13.10 DYSPHAGIA, UNSPECIFIED TYPE: ICD-10-CM

## 2020-12-21 DIAGNOSIS — R13.14 PHARYNGOESOPHAGEAL DYSPHAGIA: Primary | ICD-10-CM

## 2020-12-21 PROCEDURE — 92526 ORAL FUNCTION THERAPY: CPT | Performed by: SPEECH-LANGUAGE PATHOLOGIST

## 2020-12-21 NOTE — PROGRESS NOTES
"Outpatient Speech Language Pathology   Adult Swallow Treatment Note       Patient Name: Ryan Osborn  : 1960  MRN: [id number]  Today's Date: 2020         Visit Date: 2020   Patient Active Problem List   Diagnosis   • Odynophagia        Visit Dx:    ICD-10-CM ICD-9-CM   1. Pharyngoesophageal dysphagia  R13.14 787.24   2. Dysphagia, unspecified type  R13.10 787.20          VFSS completed. See report. Therapy warranted and goals added today. Patient agreed to plan. Patient brought a notebook he uses for medical information. SLP wrote instructions for each exercise and a reminder to shave for therapy with estim. Discussed plan with brother after therapy. Brother will call back to make appointments as he declined to come in and make them at the time of the evaluation today.           SLP OP Goals     Row Name 20 1425          Goal Type Needed    Goal Type Needed  Dysphagia  -KG        Subjective Comments    Subjective Comments  First therapy session. Goals added. VFSS completed, see report.   -KG        Dysphagia Goals    Dysphagia LTG's  Patient will safely consume the recommended diet without complications such as aspiration pneumonia  -KG     Patient will safely consume the recommended diet without complications such as aspiration pneumonia  Mech soft due to dentition and thin liquids  -KG     Status: Patient will safely consume the recommended diet without complications such as aspiration pneumonia  New  -KG     Comments: Patient will safely consume the recommended diet without complications such as aspiration pneumonia  Patient reports continued couging on food and liquids, states that he \"can bring it up\"  -KG     Dysphagia STG's  Patient will improve tongue elevation to enhance safety and increase eating efficiency through lingual elevation;Patient will improve oral skills to enhance safety and increase eating efficiency by increasing accuracy of A-P tongue movements;Patient will " increase strength of tongue base and posterior pharyngeal walls to reduce residue that might fall into airway by completing  -KG     Patient will improve tongue elevation to enhance safety and increase eating efficiency through lingual elevation  with intermittent cues  -KG     Status: Patient will improve tongue elevation to enhance safety and increase eating efficiency through lingual elevation  New  -KG     Comments: Patient will improve tongue elevation to enhance safety and increase eating efficiency through lingual elevation  Introduced tongue tip up with resistance, written instructions given  -KG     Patient will improve oral skills to enhance safety and increase eating efficiency by increasing accuracy of A-P tongue movements  with intermittent cues  -KG     Status: Patient will improve oral skills to enhance safety and increase eating efficiency by increasing accuracy of A-P tongue movements  New  -KG     Comments: Patient will improve oral skills to enhance safety and increase eating efficiency by increasing accuracy of A-P tongue movements  Introduced tongue protrusion against resistance, written instructions given  -KG     Patient will improve stage transition duration of swallow/improve timing to reduce food falling into the airway by decreasing swallow delay after neurosensory  stimulation  neurosensory stimulation  -KG     Status: Patient will improve stage transition duration of swallow/improve timing to reduce food falling into the airway by decreasing swallow delay after neurosensory  stimulation  New  -KG     Comments: Patient will improve stage transition duration of swallow/improve timing to reduce food falling into the airway by decreasing swallow delay after neurosensory  stimulation  Not yet started. Pt had not shaved under his chin. He was given written instructions to do so before next apt.   -KG     Patient will increase strength of tongue base and posterior pharyngeal walls to reduce  residue that might fall into airway by completing  effotful swallow  -KG     Status: Patient will increase strength of tongue base and posterior pharyngeal walls to reduce residue that might fall into airway by completing  New  -KG     Comments: Patient will increase strength of tongue base and posterior pharyngeal walls to reduce residue that might fall into airway by completing  Patient able to complete after instruction and with continued cuing, written instructions given.   -KG        SLP Time Calculation    SLP Goal Re-Cert Due Date  02/03/21  -KG       User Key  (r) = Recorded By, (t) = Taken By, (c) = Cosigned By    Initials Name Provider Type    Yuni Prakash CCC-SLP Speech and Language Pathologist          OP SLP Education     Row Name 12/21/20 1600       Education    Barriers to Learning  Decreased comprehension  -KG    Action Taken to Address Barriers  Education with brother after session. Wrote instructions for exercises in patient's notebook.   -KG    Learning Method  Explanation;Written materials  -KG    Teaching Response  Verbalized understanding;Reinforcement needed  -KG      User Key  (r) = Recorded By, (t) = Taken By, (c) = Cosigned By    Initials Name Effective Dates    Yuni Prakash CCC-SLP 02/11/20 -           OP SLP Assessment/Plan - 12/21/20 1600        SLP Assessment    Functional Problems  Swallowing   -KG    Impact on Function: Swallowing  Risk of aspiration;Risk of pneumonia;Impact on social aspects of eating   -KG    Clinical Impression: Swallowing  pharyngoesophageal dysphagia   -KG    Clinical Impression Comments  See VFSS report. Therapy warranted. Goals added   -KG    SLP Diagnosis  Pharyngo-esophageal dysphagia with negative impact on oral phase due to very poor dentition.    -KG    Patient/caregiver participated in establishment of treatment plan and goals  Yes   -KG       SLP Plan    Frequency  2x/ week   -KG    Duration  8 - 12 weeks then reassess   -KG    Planned  CPT's?  SLP SWALLOW THERAPY: 19055   -KG    Plan Comments  Initiate therapy and home exercises.    -KG      User Key  (r) = Recorded By, (t) = Taken By, (c) = Cosigned By    Initials Name Provider Type    Yuni Prkaash, CCC-SLP Speech and Language Pathologist                Time Calculation:                     Yuni Castro CCC-SLP  12/21/2020

## 2021-01-19 ENCOUNTER — TREATMENT (OUTPATIENT)
Dept: PHYSICAL THERAPY | Facility: CLINIC | Age: 61
End: 2021-01-19

## 2021-01-19 DIAGNOSIS — R13.14 PHARYNGOESOPHAGEAL DYSPHAGIA: Primary | ICD-10-CM

## 2021-01-19 PROCEDURE — 92526 ORAL FUNCTION THERAPY: CPT | Performed by: SPEECH-LANGUAGE PATHOLOGIST

## 2021-01-19 NOTE — PROGRESS NOTES
"Outpatient Speech Language Pathology   Adult Swallow Treatment Note       Patient Name: Ryan Osborn  : 1960  MRN: [id number]  Today's Date: 2021         Visit Date: 2021   Patient Active Problem List   Diagnosis   • Odynophagia        Visit Dx:    ICD-10-CM ICD-9-CM   1. Pharyngoesophageal dysphagia  R13.14 787.24                         SLP OP Goals     Row Name 21 1553          Goal Type Needed    Goal Type Needed  Dysphagia  -KG (r) ME (t) KG (c)        Subjective Comments    Subjective Comments  Patient was alert and ready for therapy. He brought his notebook for therapy notes. Visits scheduled for 6 weeks.  -KG        Dysphagia Goals    Dysphagia LTG's  Patient will safely consume the recommended diet without complications such as aspiration pneumonia  -KG (r) ME (t) KG (c)     Patient will safely consume the recommended diet without complications such as aspiration pneumonia  Mech soft due to dentition and thin liquids  -KG (r) ME (t) KG (c)     Status: Patient will safely consume the recommended diet without complications such as aspiration pneumonia  Progressing as expected  -KG     Comments: Patient will safely consume the recommended diet without complications such as aspiration pneumonia  Patient reports continued couging on food and liquids, states that he \"can bring it up\"  -KG (r) ME (t) KG (c)     Dysphagia STG's  Patient will improve tongue elevation to enhance safety and increase eating efficiency through lingual elevation;Patient will improve oral skills to enhance safety and increase eating efficiency by increasing accuracy of A-P tongue movements;Patient will increase strength of tongue base and posterior pharyngeal walls to reduce residue that might fall into airway by completing  -KG (r) ME (t) KG (c)     Patient will improve tongue elevation to enhance safety and increase eating efficiency through lingual elevation  with intermittent cues  -KG (r) ME (t) KG (c)     " Status: Patient will improve tongue elevation to enhance safety and increase eating efficiency through lingual elevation  Progressing as expected  -KG     Comments: Patient will improve tongue elevation to enhance safety and increase eating efficiency through lingual elevation  Not directly addressed today.   -KG     Patient will improve oral skills to enhance safety and increase eating efficiency by increasing accuracy of A-P tongue movements  with intermittent cues  -KG (r) ME (t) KG (c)     Status: Patient will improve oral skills to enhance safety and increase eating efficiency by increasing accuracy of A-P tongue movements  Progressing as expected  -KG     Comments: Patient will improve oral skills to enhance safety and increase eating efficiency by increasing accuracy of A-P tongue movements  Not directly addressed today.   -KG     Patient will improve stage transition duration of swallow/improve timing to reduce food falling into the airway by decreasing swallow delay after neurosensory  stimulation  neurosensory stimulation  -KG (r) ME (t) KG (c)     Status: Patient will improve stage transition duration of swallow/improve timing to reduce food falling into the airway by decreasing swallow delay after neurosensory  stimulation  Progressing as expected  -KG     Comments: Patient will improve stage transition duration of swallow/improve timing to reduce food falling into the airway by decreasing swallow delay after neurosensory  stimulation  Introduced estim with instruction and cues. Patient able to demonstrate swallows with contraction, needed sips of water intermittently.   -KG     Patient will increase strength of tongue base and posterior pharyngeal walls to reduce residue that might fall into airway by completing  effotful swallow  -KG (r) ME (t) KG (c)     Status: Patient will increase strength of tongue base and posterior pharyngeal walls to reduce residue that might fall into airway by completing   Progressing as expected  -KG     Comments: Patient will increase strength of tongue base and posterior pharyngeal walls to reduce residue that might fall into airway by completing  Patient able to complete after instruction and with continued cuing, written instructions given.   -KG (r) ME (t) KG (c)        SLP Time Calculation    SLP Goal Re-Cert Due Date  03/12/21  -KG (r) ME (t) KG (c)       User Key  (r) = Recorded By, (t) = Taken By, (c) = Cosigned By    Initials Name Provider Type    Yuni Prakash CCC-SLP Speech and Language Pathologist    Merna Castaneda, Speech Therapy Student Speech Therapy Student          OP SLP Education     Row Name 01/19/21 1600       Education    Barriers to Learning  Decreased comprehension  -KG    Action Taken to Address Barriers  Education written in his notebook by SLP  -KG    Teaching Response  Demonstrated understanding;Reinforcement needed  -KG      User Key  (r) = Recorded By, (t) = Taken By, (c) = Cosigned By    Initials Name Effective Dates    Yuni Prakash CCC-SLP 02/11/20 -           OP SLP Assessment/Plan - 01/19/21 1600        SLP Assessment    Functional Problems  Swallowing   -KG    Clinical Impression Comments  Patient able to participate with estim and effortful swallows given instruction and cues.    -KG       SLP Plan    Plan Comments  Visits scheduled. Continue therapy and home exercises.    -KG      User Key  (r) = Recorded By, (t) = Taken By, (c) = Cosigned By    Initials Name Provider Type    Yuni Prakash CCC-SLP Speech and Language Pathologist                Time Calculation:                     TYLER Marr  1/19/2021

## 2021-01-21 ENCOUNTER — TREATMENT (OUTPATIENT)
Dept: PHYSICAL THERAPY | Facility: CLINIC | Age: 61
End: 2021-01-21

## 2021-01-21 DIAGNOSIS — R13.14 PHARYNGOESOPHAGEAL DYSPHAGIA: Primary | ICD-10-CM

## 2021-01-21 PROCEDURE — 92526 ORAL FUNCTION THERAPY: CPT | Performed by: SPEECH-LANGUAGE PATHOLOGIST

## 2021-01-21 NOTE — PROGRESS NOTES
Outpatient Speech Language Pathology   Adult Swallow Treatment Note       Patient Name: Ryan Osborn  : 1960  MRN: [id number]  Today's Date: 2021         Visit Date: 2021   Patient Active Problem List   Diagnosis   • Odynophagia        Visit Dx:    ICD-10-CM ICD-9-CM   1. Pharyngoesophageal dysphagia  R13.14 787.24                         SLP OP Goals     Row Name 21 1430          Goal Type Needed    Goal Type Needed  Dysphagia  -KG (r) ME (t) KG (c)        Subjective Comments    Subjective Comments  Patient was ready for therapy and brought his notebook for therapy notes.  -KG (r) ME (t) KG (c)        Dysphagia Goals    Dysphagia LTG's  Patient will safely consume the recommended diet without complications such as aspiration pneumonia  -KG (r) ME (t) KG (c)     Patient will safely consume the recommended diet without complications such as aspiration pneumonia  Mech soft due to dentition and thin liquids  -KG (r) ME (t) KG (c)     Status: Patient will safely consume the recommended diet without complications such as aspiration pneumonia  Progressing as expected  -KG (r) ME (t) KG (c)     Comments: Patient will safely consume the recommended diet without complications such as aspiration pneumonia  Patient feels that he is improving.   -KG     Dysphagia STG's  Patient will improve tongue elevation to enhance safety and increase eating efficiency through lingual elevation;Patient will improve oral skills to enhance safety and increase eating efficiency by increasing accuracy of A-P tongue movements;Patient will increase strength of tongue base and posterior pharyngeal walls to reduce residue that might fall into airway by completing  -KG (r) ME (t) KG (c)     Patient will improve tongue elevation to enhance safety and increase eating efficiency through lingual elevation  with intermittent cues  -KG (r) ME (t) KG (c)     Status: Patient will improve tongue elevation to enhance safety and  increase eating efficiency through lingual elevation  Progressing as expected  -KG (r) ME (t) KG (c)     Comments: Patient will improve tongue elevation to enhance safety and increase eating efficiency through lingual elevation  Not directly addressed today.   -KG (r) ME (t) KG (c)     Patient will improve oral skills to enhance safety and increase eating efficiency by increasing accuracy of A-P tongue movements  with intermittent cues  -KG (r) ME (t) KG (c)     Status: Patient will improve oral skills to enhance safety and increase eating efficiency by increasing accuracy of A-P tongue movements  Progressing as expected  -KG (r) ME (t) KG (c)     Comments: Patient will improve oral skills to enhance safety and increase eating efficiency by increasing accuracy of A-P tongue movements  Not directly addressed today.   -KG (r) ME (t) KG (c)     Patient will improve stage transition duration of swallow/improve timing to reduce food falling into the airway by decreasing swallow delay after neurosensory  stimulation  neurosensory stimulation  -KG (r) ME (t) KG (c)     Status: Patient will improve stage transition duration of swallow/improve timing to reduce food falling into the airway by decreasing swallow delay after neurosensory  stimulation  Progressing as expected  -KG (r) ME (t) KG (c)     Comments: Patient will improve stage transition duration of swallow/improve timing to reduce food falling into the airway by decreasing swallow delay after neurosensory  stimulation  Patient was able to demonstrate swallows with contraction. He required sips of water intermittently.   -KG (r) ME (t) KG (c)     Patient will increase strength of tongue base and posterior pharyngeal walls to reduce residue that might fall into airway by completing  effotful swallow  -KG (r) ME (t) KG (c)     Status: Patient will increase strength of tongue base and posterior pharyngeal walls to reduce residue that might fall into airway by completing   Progressing as expected  -KG (r) ME (t) KG (c)     Comments: Patient will increase strength of tongue base and posterior pharyngeal walls to reduce residue that might fall into airway by completing  Previous: Patient able to complete after instruction and with continued cuing, written instructions given.   -KG (r) ME (t) KG (c)        SLP Time Calculation    SLP Goal Re-Cert Due Date  03/14/21  -KG (r) ME (t) KG (c)       User Key  (r) = Recorded By, (t) = Taken By, (c) = Cosigned By    Initials Name Provider Type    Yuni Prakash CCC-SLP Speech and Language Pathologist    ME Merna Paris, Speech Therapy Student Speech Therapy Student          OP SLP Education     Row Name 01/21/21 1400       Education    Barriers to Learning  Decreased comprehension  -KG (r) ME (t) KG (c)    Action Taken to Address Barriers  Education written in his notebook by SLP  -KG (r) ME (t) KG (c)    Teaching Response  Reinforcement needed;Demonstrated understanding  -KG (r) ME (t) KG (c)      User Key  (r) = Recorded By, (t) = Taken By, (c) = Cosigned By    Initials Name Effective Dates    Yuni Prakash CCC-SLP 02/11/20 -     ME Merna Paris Speech Therapy Student 12/11/20 -           OP SLP Assessment/Plan - 01/21/21 1400        SLP Assessment    Functional Problems  Swallowing   -KG (r) ME (t) KG (c)    Impact on Function: Swallowing  Risk of aspiration;Risk of pneumonia;Impact on social aspects of eating   -KG (r) ME (t) KG (c)    Clinical Impression Comments  Patient able to participate with estim and effortful swallows with minimal cueing.   -KG (r) ME (t) KG (c)       SLP Plan    Plan Comments  Continue therapy and home exercises.   -KG (r) ME (t) KG (c)      User Key  (r) = Recorded By, (t) = Taken By, (c) = Cosigned By    Initials Name Provider Type    Yuni Prakash CCC-SLP Speech and Language Pathologist    Merna Castaneda, Speech Therapy Student Speech Therapy Student                Time Calculation:                      Yuni Castro, CCC-SLP  1/21/2021

## 2021-01-26 ENCOUNTER — TREATMENT (OUTPATIENT)
Dept: PHYSICAL THERAPY | Facility: CLINIC | Age: 61
End: 2021-01-26

## 2021-01-26 DIAGNOSIS — R13.14 PHARYNGOESOPHAGEAL DYSPHAGIA: Primary | ICD-10-CM

## 2021-01-26 PROCEDURE — 92526 ORAL FUNCTION THERAPY: CPT | Performed by: SPEECH-LANGUAGE PATHOLOGIST

## 2021-01-26 NOTE — PROGRESS NOTES
Outpatient Speech Language Pathology   Adult Swallow Treatment Note       Patient Name: Ryan Osborn  : 1960  MRN: [id number]  Today's Date: 2021         Visit Date: 2021   Patient Active Problem List   Diagnosis   • Odynophagia        Visit Dx:    ICD-10-CM ICD-9-CM   1. Pharyngoesophageal dysphagia  R13.14 787.24             SLP OP Goals     Row Name 21 1000          Goal Type Needed    Goal Type Needed  Dysphagia  -KG        Subjective Comments    Subjective Comments  Patient was alert and ready for therapy  -KG        Dysphagia Goals    Dysphagia LTG's  Patient will safely consume the recommended diet without complications such as aspiration pneumonia  -KG     Patient will safely consume the recommended diet without complications such as aspiration pneumonia  Mech soft due to dentition and thin liquids  -KG     Status: Patient will safely consume the recommended diet without complications such as aspiration pneumonia  Progressing as expected  -KG     Comments: Patient will safely consume the recommended diet without complications such as aspiration pneumonia  Patient feels that he continues to improving.   -KG     Dysphagia STG's  Patient will improve tongue elevation to enhance safety and increase eating efficiency through lingual elevation;Patient will improve oral skills to enhance safety and increase eating efficiency by increasing accuracy of A-P tongue movements;Patient will increase strength of tongue base and posterior pharyngeal walls to reduce residue that might fall into airway by completing  -KG     Patient will improve tongue elevation to enhance safety and increase eating efficiency through lingual elevation  with intermittent cues  -KG     Status: Patient will improve tongue elevation to enhance safety and increase eating efficiency through lingual elevation  Progressing as expected  -KG     Comments: Patient will improve tongue elevation to enhance safety and increase  eating efficiency through lingual elevation  Completing exercise at home per pt report  -KG     Patient will improve oral skills to enhance safety and increase eating efficiency by increasing accuracy of A-P tongue movements  with intermittent cues  -KG     Status: Patient will improve oral skills to enhance safety and increase eating efficiency by increasing accuracy of A-P tongue movements  Progressing as expected  -KG     Comments: Patient will improve oral skills to enhance safety and increase eating efficiency by increasing accuracy of A-P tongue movements  Completing at home per pt report  -KG     Patient will improve stage transition duration of swallow/improve timing to reduce food falling into the airway by decreasing swallow delay after neurosensory  stimulation  neurosensory stimulation  -KG     Status: Patient will improve stage transition duration of swallow/improve timing to reduce food falling into the airway by decreasing swallow delay after neurosensory  stimulation  Progressing as expected  -KG     Comments: Patient will improve stage transition duration of swallow/improve timing to reduce food falling into the airway by decreasing swallow delay after neurosensory  stimulation  Patient was able to demonstrate swallows with contraction. He required sips of water intermittently.   -KG     Patient will increase strength of tongue base and posterior pharyngeal walls to reduce residue that might fall into airway by completing  effotful swallow  -KG     Status: Patient will increase strength of tongue base and posterior pharyngeal walls to reduce residue that might fall into airway by completing  Progressing as expected  -KG     Comments: Patient will increase strength of tongue base and posterior pharyngeal walls to reduce residue that might fall into airway by completing  Patient completed effortful swallows with estim today.   -KG        SLP Time Calculation    SLP Goal Re-Cert Due Date  03/14/21  -KG        User Key  (r) = Recorded By, (t) = Taken By, (c) = Cosigned By    Initials Name Provider Type    Yuni Prakash CCC-SLP Speech and Language Pathologist          OP SLP Education     Row Name 01/26/21 1100       Education    Barriers to Learning  No barriers identified  -KG    Action Taken to Address Barriers  Demonstrated understanding, had his notebook for notes.   -KG      User Key  (r) = Recorded By, (t) = Taken By, (c) = Cosigned By    Initials Name Effective Dates    Yuni Prakash CCC-SLP 02/11/20 -           OP SLP Assessment/Plan - 01/26/21 1100        SLP Assessment    Functional Problems  Swallowing   -KG    Clinical Impression Comments  Patient able to complete effortful swallows with estim. He feels he is making progress.    -KG       SLP Plan    Plan Comments  Continue therapy and home exercises.    -KG      User Key  (r) = Recorded By, (t) = Taken By, (c) = Cosigned By    Initials Name Provider Type    Yuni Prakash CCC-SLP Speech and Language Pathologist                       Yuni Castro CCC-WEI  1/26/2021

## 2021-01-28 ENCOUNTER — TREATMENT (OUTPATIENT)
Dept: PHYSICAL THERAPY | Facility: CLINIC | Age: 61
End: 2021-01-28

## 2021-01-28 DIAGNOSIS — R13.14 PHARYNGOESOPHAGEAL DYSPHAGIA: Primary | ICD-10-CM

## 2021-01-28 PROCEDURE — 92526 ORAL FUNCTION THERAPY: CPT | Performed by: SPEECH-LANGUAGE PATHOLOGIST

## 2021-01-28 NOTE — PROGRESS NOTES
Outpatient Speech Language Pathology   Adult Swallow Treatment Note       Patient Name: Ryan Osborn  : 1960  MRN: [id number]  Today's Date: 2021         Visit Date: 2021   Patient Active Problem List   Diagnosis   • Odynophagia        Visit Dx:    ICD-10-CM ICD-9-CM   1. Pharyngoesophageal dysphagia  R13.14 787.24                         SLP OP Goals     Row Name 21 1400          Goal Type Needed    Goal Type Needed  Dysphagia  -KG (r) ME (t) KG (c)        Subjective Comments    Subjective Comments  Patient was alert and ready for therapy.  -KG (r) ME (t) KG (c)        Dysphagia Goals    Dysphagia LTG's  Patient will safely consume the recommended diet without complications such as aspiration pneumonia  -KG (r) ME (t) KG (c)     Patient will safely consume the recommended diet without complications such as aspiration pneumonia  Mech soft due to dentition and thin liquids  -KG (r) ME (t) KG (c)     Status: Patient will safely consume the recommended diet without complications such as aspiration pneumonia  Progressing as expected  -KG (r) ME (t) KG (c)     Comments: Patient will safely consume the recommended diet without complications such as aspiration pneumonia  Patient feels that he continues to improving.   -KG (r) ME (t) KG (c)     Dysphagia STG's  Patient will improve tongue elevation to enhance safety and increase eating efficiency through lingual elevation;Patient will improve oral skills to enhance safety and increase eating efficiency by increasing accuracy of A-P tongue movements;Patient will increase strength of tongue base and posterior pharyngeal walls to reduce residue that might fall into airway by completing  -KG (r) ME (t) KG (c)     Patient will improve tongue elevation to enhance safety and increase eating efficiency through lingual elevation  with intermittent cues  -KG (r) ME (t) KG (c)     Status: Patient will improve tongue elevation to enhance safety and increase  eating efficiency through lingual elevation  Progressing as expected  -KG (r) ME (t) KG (c)     Comments: Patient will improve tongue elevation to enhance safety and increase eating efficiency through lingual elevation  Completing exercise at home per pt report  -KG (r) ME (t) KG (c)     Patient will improve oral skills to enhance safety and increase eating efficiency by increasing accuracy of A-P tongue movements  with intermittent cues  -KG (r) ME (t) KG (c)     Status: Patient will improve oral skills to enhance safety and increase eating efficiency by increasing accuracy of A-P tongue movements  Progressing as expected  -KG (r) ME (t) KG (c)     Comments: Patient will improve oral skills to enhance safety and increase eating efficiency by increasing accuracy of A-P tongue movements  Completing at home per pt report  -KG (r) ME (t) KG (c)     Patient will improve stage transition duration of swallow/improve timing to reduce food falling into the airway by decreasing swallow delay after neurosensory  stimulation  neurosensory stimulation  -KG (r) ME (t) KG (c)     Status: Patient will improve stage transition duration of swallow/improve timing to reduce food falling into the airway by decreasing swallow delay after neurosensory  stimulation  Progressing as expected  -KG (r) ME (t) KG (c)     Comments: Patient will improve stage transition duration of swallow/improve timing to reduce food falling into the airway by decreasing swallow delay after neurosensory  stimulation  Patient was able to demonstrate swallows with contraction. He required sips of water intermittently when a swallow was more difficult.   -KG (r) ME (t) KG (c)     Patient will increase strength of tongue base and posterior pharyngeal walls to reduce residue that might fall into airway by completing  effotful swallow  -KG (r) ME (t) KG (c)     Status: Patient will increase strength of tongue base and posterior pharyngeal walls to reduce residue  that might fall into airway by completing  Progressing as expected  -KG (r) ME (t) KG (c)     Comments: Patient will increase strength of tongue base and posterior pharyngeal walls to reduce residue that might fall into airway by completing  Patient completed effortful swallows with A1 level 16 estim today.   -KG (r) ME (t) KG (c)        SLP Time Calculation    SLP Goal Re-Cert Due Date  03/21/21  -KG (r) ME (t) KG (c)       User Key  (r) = Recorded By, (t) = Taken By, (c) = Cosigned By    Initials Name Provider Type    Ynui Prakash CCC-SLP Speech and Language Pathologist    ME Merna Paris, Speech Therapy Student Speech Therapy Student          OP SLP Education     Row Name 01/28/21 1400       Education    Barriers to Learning  No barriers identified  -KG (r) ME (t) KG (c)    Action Taken to Address Barriers  Demonstrated understanding  -KG (r) ME (t) KG (c)      User Key  (r) = Recorded By, (t) = Taken By, (c) = Cosigned By    Initials Name Effective Dates    Yuni Prakash CCC-SLP 02/11/20 -     ME Merna Paris Speech Therapy Student 12/11/20 -           OP SLP Assessment/Plan - 01/28/21 1400        SLP Assessment    Functional Problems  Swallowing   -KG (r) ME (t) KG (c)    Clinical Impression Comments  Patient able to complete effortful swallows with estim. He stated his swallowing is doing good.   -KG (r) ME (t) KG (c)       SLP Plan    Plan Comments  Continue therapy and home exercises.   -KG (r) ME (t) KG (c)      User Key  (r) = Recorded By, (t) = Taken By, (c) = Cosigned By    Initials Name Provider Type    Yuni Prakash CCC-SLP Speech and Language Pathologist    ME Meran Paris, Speech Therapy Student Speech Therapy Student                Time Calculation:                     TYLER Marr  1/28/2021

## 2021-02-02 ENCOUNTER — TREATMENT (OUTPATIENT)
Dept: PHYSICAL THERAPY | Facility: CLINIC | Age: 61
End: 2021-02-02

## 2021-02-02 DIAGNOSIS — R13.14 PHARYNGOESOPHAGEAL DYSPHAGIA: Primary | ICD-10-CM

## 2021-02-02 PROCEDURE — 92526 ORAL FUNCTION THERAPY: CPT | Performed by: SPEECH-LANGUAGE PATHOLOGIST

## 2021-02-02 NOTE — PROGRESS NOTES
Outpatient Speech Language Pathology   Adult Swallow Treatment Note       Patient Name: Ryan Osborn  : 1960  MRN: [id number]  Today's Date: 2021         Visit Date: 2021   Patient Active Problem List   Diagnosis   • Odynophagia        Visit Dx:    ICD-10-CM ICD-9-CM   1. Pharyngoesophageal dysphagia  R13.14 787.24                         SLP OP Goals     Row Name 21 1400          Goal Type Needed    Goal Type Needed  Dysphagia  -KG (r) ME (t) KG (c)        Subjective Comments    Subjective Comments  Patient was alert and ready for therapy.  -KG (r) ME (t) KG (c)        Dysphagia Goals    Dysphagia LTG's  Patient will safely consume the recommended diet without complications such as aspiration pneumonia  -KG (r) ME (t) KG (c)     Patient will safely consume the recommended diet without complications such as aspiration pneumonia  Mech soft due to dentition and thin liquids  -KG (r) ME (t) KG (c)     Status: Patient will safely consume the recommended diet without complications such as aspiration pneumonia  Progressing as expected  -KG (r) ME (t) KG (c)     Comments: Patient will safely consume the recommended diet without complications such as aspiration pneumonia  Patient feels that he continues to improve.  -KG (r) ME (t) KG (c)     Dysphagia STG's  Patient will improve tongue elevation to enhance safety and increase eating efficiency through lingual elevation;Patient will improve oral skills to enhance safety and increase eating efficiency by increasing accuracy of A-P tongue movements;Patient will increase strength of tongue base and posterior pharyngeal walls to reduce residue that might fall into airway by completing  -KG (r) ME (t) KG (c)     Patient will improve tongue elevation to enhance safety and increase eating efficiency through lingual elevation  with intermittent cues  -KG (r) ME (t) KG (c)     Status: Patient will improve tongue elevation to enhance safety and increase  eating efficiency through lingual elevation  Progressing as expected  -KG (r) ME (t) KG (c)     Comments: Patient will improve tongue elevation to enhance safety and increase eating efficiency through lingual elevation  Completing exercise at home per pt report  -KG (r) ME (t) KG (c)     Patient will improve oral skills to enhance safety and increase eating efficiency by increasing accuracy of A-P tongue movements  with intermittent cues  -KG (r) ME (t) KG (c)     Status: Patient will improve oral skills to enhance safety and increase eating efficiency by increasing accuracy of A-P tongue movements  Progressing as expected  -KG (r) ME (t) KG (c)     Comments: Patient will improve oral skills to enhance safety and increase eating efficiency by increasing accuracy of A-P tongue movements  Completing at home per pt report  -KG (r) ME (t) KG (c)     Patient will improve stage transition duration of swallow/improve timing to reduce food falling into the airway by decreasing swallow delay after neurosensory  stimulation  neurosensory stimulation  -KG (r) ME (t) KG (c)     Status: Patient will improve stage transition duration of swallow/improve timing to reduce food falling into the airway by decreasing swallow delay after neurosensory  stimulation  Progressing as expected  -KG (r) ME (t) KG (c)     Comments: Patient will improve stage transition duration of swallow/improve timing to reduce food falling into the airway by decreasing swallow delay after neurosensory  stimulation  Patient was able to demonstrate swallows with contraction. Some coughing was noted during ESTIM after a swallow. He required sips of water intermittently when a swallow was more difficult.   -KG (r) ME (t) KG (c)     Patient will increase strength of tongue base and posterior pharyngeal walls to reduce residue that might fall into airway by completing  effotful swallow  -KG (r) ME (t) KG (c)     Status: Patient will increase strength of tongue  base and posterior pharyngeal walls to reduce residue that might fall into airway by completing  Progressing as expected  -KG (r) ME (t) KG (c)     Comments: Patient will increase strength of tongue base and posterior pharyngeal walls to reduce residue that might fall into airway by completing  Patient completed effortful swallows with A1 level 16 estim today.   -KG (r) ME (t) KG (c)        SLP Time Calculation    SLP Goal Re-Cert Due Date  03/26/21  -KG (r) ME (t) KG (c)       User Key  (r) = Recorded By, (t) = Taken By, (c) = Cosigned By    Initials Name Provider Type    Yuni Prakash CCC-SLP Speech and Language Pathologist    Merna Castaneda, Speech Therapy Student Speech Therapy Student          OP SLP Education     Row Name 02/02/21 1400       Education    Barriers to Learning  No barriers identified  -KG (r) ME (t) KG (c)    Action Taken to Address Barriers  Demonstrated understanding.  -KG (r) ME (t) KG (c)    Education Comments  Discussed importance of oral hygiene.  -KG (r) ME (t) KG (c)      User Key  (r) = Recorded By, (t) = Taken By, (c) = Cosigned By    Initials Name Effective Dates    Yuni Prakash CCC-SLP 02/11/20 -     ME Merna Paris, Speech Therapy Student 12/11/20 -           OP SLP Assessment/Plan - 02/02/21 1400        SLP Assessment    Functional Problems  Swallowing   -KG (r) ME (t) KG (c)    Clinical Impression Comments  Patient able to comeplete effortful swalllows with estim. Some coughing noted after swallow during ESTIM.   -KG (r) ME (t) KG (c)       SLP Plan    Plan Comments  Continue therapy and home exercises.   -KG (r) ME (t) KG (c)      User Key  (r) = Recorded By, (t) = Taken By, (c) = Cosigned By    Initials Name Provider Type    Yuni Prakash CCC-SLP Speech and Language Pathologist    Merna Castaneda, Speech Therapy Student Speech Therapy Student                Time Calculation:                     TYLER Marr  2/2/2021

## 2021-02-04 ENCOUNTER — TREATMENT (OUTPATIENT)
Dept: PHYSICAL THERAPY | Facility: CLINIC | Age: 61
End: 2021-02-04

## 2021-02-04 DIAGNOSIS — R13.14 PHARYNGOESOPHAGEAL DYSPHAGIA: Primary | ICD-10-CM

## 2021-02-04 PROCEDURE — 92526 ORAL FUNCTION THERAPY: CPT | Performed by: SPEECH-LANGUAGE PATHOLOGIST

## 2021-02-04 NOTE — PROGRESS NOTES
Outpatient Speech Language Pathology   Adult Swallow Treatment Note       Patient Name: Ryan Osborn  : 1960  MRN: [id number]  Today's Date: 2021         Visit Date: 2021   Patient Active Problem List   Diagnosis   • Odynophagia        Visit Dx:    ICD-10-CM ICD-9-CM   1. Pharyngoesophageal dysphagia  R13.14 787.24                         SLP OP Goals     Row Name 21 1420          Goal Type Needed    Goal Type Needed  Dysphagia  -KG (r) ME (t) KG (c)        Subjective Comments    Subjective Comments  Patient was alert and ready for therapy.  -KG (r) ME (t) KG (c)        Dysphagia Goals    Dysphagia LTG's  Patient will safely consume the recommended diet without complications such as aspiration pneumonia  -KG (r) ME (t) KG (c)     Patient will safely consume the recommended diet without complications such as aspiration pneumonia  Mech soft due to dentition and thin liquids  -KG (r) ME (t) KG (c)     Status: Patient will safely consume the recommended diet without complications such as aspiration pneumonia  Progressing as expected  -KG (r) ME (t) KG (c)     Comments: Patient will safely consume the recommended diet without complications such as aspiration pneumonia  Patient feels that he continues to improve.  -KG (r) ME (t) KG (c)     Dysphagia STG's  Patient will improve tongue elevation to enhance safety and increase eating efficiency through lingual elevation;Patient will improve oral skills to enhance safety and increase eating efficiency by increasing accuracy of A-P tongue movements;Patient will increase strength of tongue base and posterior pharyngeal walls to reduce residue that might fall into airway by completing  -KG (r) ME (t) KG (c)     Patient will improve tongue elevation to enhance safety and increase eating efficiency through lingual elevation  with intermittent cues  -KG (r) ME (t) KG (c)     Status: Patient will improve tongue elevation to enhance safety and increase  eating efficiency through lingual elevation  Progressing as expected  -KG (r) ME (t) KG (c)     Comments: Patient will improve tongue elevation to enhance safety and increase eating efficiency through lingual elevation  Completing exercise at home per pt report  -KG (r) ME (t) KG (c)     Patient will improve oral skills to enhance safety and increase eating efficiency by increasing accuracy of A-P tongue movements  with intermittent cues  -KG (r) ME (t) KG (c)     Status: Patient will improve oral skills to enhance safety and increase eating efficiency by increasing accuracy of A-P tongue movements  Progressing as expected  -KG (r) ME (t) KG (c)     Comments: Patient will improve oral skills to enhance safety and increase eating efficiency by increasing accuracy of A-P tongue movements  Completing at home per pt report  -KG (r) ME (t) KG (c)     Patient will improve stage transition duration of swallow/improve timing to reduce food falling into the airway by decreasing swallow delay after neurosensory  stimulation  neurosensory stimulation  -KG (r) ME (t) KG (c)     Status: Patient will improve stage transition duration of swallow/improve timing to reduce food falling into the airway by decreasing swallow delay after neurosensory  stimulation  Progressing as expected  -KG (r) ME (t) KG (c)     Comments: Patient will improve stage transition duration of swallow/improve timing to reduce food falling into the airway by decreasing swallow delay after neurosensory  stimulation  Patient was able to demonstrate swallows with contraction. Some coughing was noted during ESTIM after a swallow. He required sips of water intermittently when a swallow was more difficult.   -KG (r) ME (t) KG (c)     Patient will increase strength of tongue base and posterior pharyngeal walls to reduce residue that might fall into airway by completing  effotful swallow  -KG (r) ME (t) KG (c)     Status: Patient will increase strength of tongue  base and posterior pharyngeal walls to reduce residue that might fall into airway by completing  Progressing as expected  -KG (r) ME (t) KG (c)     Comments: Patient will increase strength of tongue base and posterior pharyngeal walls to reduce residue that might fall into airway by completing  Patient completed effortful swallows with A1 level 16 estim today.   -KG (r) ME (t) KG (c)        SLP Time Calculation    SLP Goal Re-Cert Due Date  03/31/21  -KG (r) ME (t) KG (c)       User Key  (r) = Recorded By, (t) = Taken By, (c) = Cosigned By    Initials Name Provider Type    Yuni Prakash CCC-SLP Speech and Language Pathologist    ME Merna Paris, Speech Therapy Student Speech Therapy Student          OP SLP Education     Row Name 02/04/21 1400       Education    Barriers to Learning  No barriers identified  -KG (r) ME (t) KG (c)    Education Comments  Discussed continuing home exercises and importance of dental hygiene.  -KG (r) ME (t) KG (c)      User Key  (r) = Recorded By, (t) = Taken By, (c) = Cosigned By    Initials Name Effective Dates    Yuni Prakash CCC-SLP 02/11/20 -     ME Merna Paris Speech Therapy Student 12/11/20 -           OP SLP Assessment/Plan - 02/04/21 1400        SLP Assessment    Functional Problems  Swallowing   -KG (r) ME (t) KG (c)    Clinical Impression Comments  Patient able to complete effortful swallows with ESTIM. Intermittent coughing noted after swallow during ESTIM.   -KG (r) ME (t) KG (c)       SLP Plan    Plan Comments  Continue therapy and home exercises.   -KG (r) ME (t) KG (c)      User Key  (r) = Recorded By, (t) = Taken By, (c) = Cosigned By    Initials Name Provider Type    Yuin Prakash CCC-SLP Speech and Language Pathologist    Merna Castaneda, Speech Therapy Student Speech Therapy Student                Time Calculation:                     TYLER Marr  2/4/2021

## 2021-02-09 ENCOUNTER — TREATMENT (OUTPATIENT)
Dept: PHYSICAL THERAPY | Facility: CLINIC | Age: 61
End: 2021-02-09

## 2021-02-09 DIAGNOSIS — R13.14 PHARYNGOESOPHAGEAL DYSPHAGIA: Primary | ICD-10-CM

## 2021-02-09 PROCEDURE — 92526 ORAL FUNCTION THERAPY: CPT | Performed by: SPEECH-LANGUAGE PATHOLOGIST

## 2021-02-09 NOTE — PROGRESS NOTES
"Outpatient Speech Language Pathology   Adult Swallow Treatment Note       Patient Name: Ryan Osborn  : 1960  MRN: [id number]  Today's Date: 2021         Visit Date: 2021   Patient Active Problem List   Diagnosis   • Odynophagia        Visit Dx:    ICD-10-CM ICD-9-CM   1. Pharyngoesophageal dysphagia  R13.14 787.24                         SLP OP Goals     Row Name 21 1400          Goal Type Needed    Goal Type Needed  Dysphagia  -KG (r) ME (t) KG (c)        Subjective Comments    Subjective Comments  Patient was alert and ready for therapy.  -KG (r) ME (t) KG (c)        Dysphagia Goals    Dysphagia LTG's  Patient will safely consume the recommended diet without complications such as aspiration pneumonia  -KG (r) ME (t) KG (c)     Patient will safely consume the recommended diet without complications such as aspiration pneumonia  Mech soft due to dentition and thin liquids  -KG (r) ME (t) KG (c)     Status: Patient will safely consume the recommended diet without complications such as aspiration pneumonia  Progressing as expected  -KG (r) ME (t) KG (c)     Comments: Patient will safely consume the recommended diet without complications such as aspiration pneumonia  Patient reports that he has noticed some difficulty swallowing \"every other day.\"  -KG (r) ME (t) KG (c)     Dysphagia STG's  Patient will improve tongue elevation to enhance safety and increase eating efficiency through lingual elevation;Patient will improve oral skills to enhance safety and increase eating efficiency by increasing accuracy of A-P tongue movements;Patient will increase strength of tongue base and posterior pharyngeal walls to reduce residue that might fall into airway by completing  -KG (r) ME (t) KG (c)     Patient will improve tongue elevation to enhance safety and increase eating efficiency through lingual elevation  with intermittent cues  -KG (r) ME (t) KG (c)     Status: Patient will improve tongue " elevation to enhance safety and increase eating efficiency through lingual elevation  Progressing as expected  -KG (r) ME (t) KG (c)     Comments: Patient will improve tongue elevation to enhance safety and increase eating efficiency through lingual elevation  Completing exercise at home per pt report  -KG (r) ME (t) KG (c)     Patient will improve oral skills to enhance safety and increase eating efficiency by increasing accuracy of A-P tongue movements  with intermittent cues  -KG (r) ME (t) KG (c)     Status: Patient will improve oral skills to enhance safety and increase eating efficiency by increasing accuracy of A-P tongue movements  Progressing as expected  -KG (r) ME (t) KG (c)     Comments: Patient will improve oral skills to enhance safety and increase eating efficiency by increasing accuracy of A-P tongue movements  Completing at home per pt report  -KG (r) ME (t) KG (c)     Patient will improve stage transition duration of swallow/improve timing to reduce food falling into the airway by decreasing swallow delay after neurosensory  stimulation  neurosensory stimulation  -KG (r) ME (t) KG (c)     Status: Patient will improve stage transition duration of swallow/improve timing to reduce food falling into the airway by decreasing swallow delay after neurosensory  stimulation  Progressing as expected  -KG (r) ME (t) KG (c)     Comments: Patient will improve stage transition duration of swallow/improve timing to reduce food falling into the airway by decreasing swallow delay after neurosensory  stimulation  Patient was able to demonstrate swallows with contraction. Noted a reduction in amount of coughing during ESTIM after a swallow. He required sips of water intermittently when a swallow was more difficult.   -KG (r) ME (t) KG (c)     Patient will increase strength of tongue base and posterior pharyngeal walls to reduce residue that might fall into airway by completing  effotful swallow  -KG (r) ME (t) KG (c)      Status: Patient will increase strength of tongue base and posterior pharyngeal walls to reduce residue that might fall into airway by completing  Progressing as expected  -KG (r) ME (t) KG (c)     Comments: Patient will increase strength of tongue base and posterior pharyngeal walls to reduce residue that might fall into airway by completing  Patient completed effortful swallows with A1 level 16 estim today.   -KG (r) ME (t) KG (c)        SLP Time Calculation    SLP Goal Re-Cert Due Date  04/02/21  -KG (r) ME (t) KG (c)       User Key  (r) = Recorded By, (t) = Taken By, (c) = Cosigned By    Initials Name Provider Type    Yuni Prakash CCC-SLP Speech and Language Pathologist    Merna Castaneda, Speech Therapy Student Speech Therapy Student          OP SLP Education     Row Name 02/09/21 1400       Education    Barriers to Learning  No barriers identified  -KG (r) ME (t) KG (c)    Education Comments  Discussed progress and importance of hygiene.  -KG (r) ME (t) KG (c)      User Key  (r) = Recorded By, (t) = Taken By, (c) = Cosigned By    Initials Name Effective Dates    Yuni Prakash CCC-SLP 02/11/20 -     ME Merna Paris Speech Therapy Student 12/11/20 -           OP SLP Assessment/Plan - 02/09/21 1400        SLP Assessment    Functional Problems  Swallowing   -KG (r) ME (t) KG (c)    Clinical Impression Comments  Patient able to complete effortful swallows with ESTIM. Reduction is amound of coughing after a swallow during ESTIM.   -KG (r) ME (t) KG (c)       SLP Plan    Plan Comments  Continue therapy and home exercises.   -KG (r) ME (t) KG (c)      User Key  (r) = Recorded By, (t) = Taken By, (c) = Cosigned By    Initials Name Provider Type    Yuni Prakash CCC-SLP Speech and Language Pathologist    Merna Castaneda, Speech Therapy Student Speech Therapy Student                Time Calculation:                     TYLER Marr  2/9/2021

## 2021-02-23 ENCOUNTER — TREATMENT (OUTPATIENT)
Dept: PHYSICAL THERAPY | Facility: CLINIC | Age: 61
End: 2021-02-23

## 2021-02-23 DIAGNOSIS — R13.14 PHARYNGOESOPHAGEAL DYSPHAGIA: Primary | ICD-10-CM

## 2021-02-23 PROCEDURE — 92526 ORAL FUNCTION THERAPY: CPT | Performed by: SPEECH-LANGUAGE PATHOLOGIST

## 2021-02-23 NOTE — PROGRESS NOTES
"Outpatient Speech Language Pathology   Adult Swallow Treatment Note       Patient Name: Ryan Osborn  : 1960  MRN: [id number]  Today's Date: 2021         Visit Date: 2021   Patient Active Problem List   Diagnosis   • Odynophagia        Visit Dx:    ICD-10-CM ICD-9-CM   1. Pharyngoesophageal dysphagia  R13.14 787.24           I have reviewed and agree with the following documentation completed by Merna Paris, SLP .   Yuni Castro, CCC-SLP 2021 14:46 CST                    SLP OP Goals     Row Name 21 1400          Goal Type Needed    Goal Type Needed  Dysphagia  -KG (r) ME (t) KG (c)        Subjective Comments    Subjective Comments  Patient was alert and ready for therapy.  -KG (r) ME (t) KG (c)        Dysphagia Goals    Dysphagia LTG's  Patient will safely consume the recommended diet without complications such as aspiration pneumonia  -KG (r) ME (t) KG (c)     Patient will safely consume the recommended diet without complications such as aspiration pneumonia  Mech soft due to dentition and thin liquids  -KG (r) ME (t) KG (c)     Status: Patient will safely consume the recommended diet without complications such as aspiration pneumonia  Progressing as expected  -KG (r) ME (t) KG (c)     Comments: Patient will safely consume the recommended diet without complications such as aspiration pneumonia  Patient reported that the water \"went down the wrong pipe\" during ESTIM today.  -KG (r) ME (t) KG (c)     Dysphagia STG's  Patient will improve tongue elevation to enhance safety and increase eating efficiency through lingual elevation;Patient will improve oral skills to enhance safety and increase eating efficiency by increasing accuracy of A-P tongue movements;Patient will increase strength of tongue base and posterior pharyngeal walls to reduce residue that might fall into airway by completing  -KG (r) ME (t) KG (c)     Patient will improve tongue elevation to enhance " safety and increase eating efficiency through lingual elevation  with intermittent cues  -KG (r) ME (t) KG (c)     Status: Patient will improve tongue elevation to enhance safety and increase eating efficiency through lingual elevation  Progressing as expected  -KG (r) ME (t) KG (c)     Comments: Patient will improve tongue elevation to enhance safety and increase eating efficiency through lingual elevation  Completing exercise at home per patient report.  -KG (r) ME (t) KG (c)     Patient will improve oral skills to enhance safety and increase eating efficiency by increasing accuracy of A-P tongue movements  with intermittent cues  -KG (r) ME (t) KG (c)     Status: Patient will improve oral skills to enhance safety and increase eating efficiency by increasing accuracy of A-P tongue movements  Progressing as expected  -KG (r) ME (t) KG (c)     Comments: Patient will improve oral skills to enhance safety and increase eating efficiency by increasing accuracy of A-P tongue movements  Completing at home per patient report.  -KG (r) ME (t) KG (c)     Patient will improve stage transition duration of swallow/improve timing to reduce food falling into the airway by decreasing swallow delay after neurosensory  stimulation  neurosensory stimulation  -KG (r) ME (t) KG (c)     Status: Patient will improve stage transition duration of swallow/improve timing to reduce food falling into the airway by decreasing swallow delay after neurosensory  stimulation  Progressing as expected  -KG (r) ME (t) KG (c)     Comments: Patient will improve stage transition duration of swallow/improve timing to reduce food falling into the airway by decreasing swallow delay after neurosensory  stimulation  Patient was able to demonstrate swallows with contraction. Noted minimal coughing during ESTIM after a swallow. He required sips of water intermittently when a swallow was more difficult.   -KG (r) ME (t) KG (c)     Patient will increase strength  "of tongue base and posterior pharyngeal walls to reduce residue that might fall into airway by completing  effotful swallow  -KG (r) ME (t) KG (c)     Status: Patient will increase strength of tongue base and posterior pharyngeal walls to reduce residue that might fall into airway by completing  Progressing as expected  -KG (r) ME (t) KG (c)     Comments: Patient will increase strength of tongue base and posterior pharyngeal walls to reduce residue that might fall into airway by completing  Patient completed effortful swallows with A1 level 16 estim today.   -KG (r) ME (t) KG (c)        SLP Time Calculation    SLP Goal Re-Cert Due Date  04/16/21  -KG (r) ME (t) KG (c)       User Key  (r) = Recorded By, (t) = Taken By, (c) = Cosigned By    Initials Name Provider Type    Yuni Prakash CCC-SLP Speech and Language Pathologist    ME Merna Paris, Speech Therapy Student Speech Therapy Student          OP SLP Education     Row Name 02/23/21 1400       Education    Barriers to Learning  No barriers identified  -KG (r) ME (t) KG (c)      User Key  (r) = Recorded By, (t) = Taken By, (c) = Cosigned By    Initials Name Effective Dates    Yuni Prakash CCC-SLP 02/11/20 -     ME Merna Paris Speech Therapy Student 12/11/20 -           OP SLP Assessment/Plan - 02/23/21 1400        SLP Assessment    Functional Problems  Swallowing   -KG (r) ME (t) KG (c)    Clinical Impression Comments  Patient able to complete effortful swallows with ESTIM. Noted minimal coughing during ESTIM today, patient reported that the water \"went down the wrong pipe.\"   -KG (r) ME (t) KG (c)       SLP Plan    Plan Comments  Continue therapy and home exercises.   -KG (r) ME (t) KG (c)      User Key  (r) = Recorded By, (t) = Taken By, (c) = Cosigned By    Initials Name Provider Type    Yuni Prakash CCC-SLP Speech and Language Pathologist    Merna Castaneda, Speech Therapy Student Speech Therapy Student                Time Calculation:    "                  Yuni Castro, CCC-SLP  2/23/2021

## 2021-02-25 ENCOUNTER — TREATMENT (OUTPATIENT)
Dept: PHYSICAL THERAPY | Facility: CLINIC | Age: 61
End: 2021-02-25

## 2021-02-25 DIAGNOSIS — R13.14 PHARYNGOESOPHAGEAL DYSPHAGIA: Primary | ICD-10-CM

## 2021-02-25 PROCEDURE — 92526 ORAL FUNCTION THERAPY: CPT | Performed by: SPEECH-LANGUAGE PATHOLOGIST

## 2021-02-25 NOTE — PROGRESS NOTES
"Outpatient Speech Language Pathology   Adult Swallow Treatment Note       Patient Name: Ryan Osborn  : 1960  MRN: [id number]  Today's Date: 2021         Visit Date: 2021   Patient Active Problem List   Diagnosis   • Odynophagia        Visit Dx:    ICD-10-CM ICD-9-CM   1. Pharyngoesophageal dysphagia  R13.14 787.24           I have reviewed and agree with the following documentation completed by Merna Paris, SLP .   Yuni Castro, CCC-SLP 2021 17:20 CST                    SLP OP Goals     Row Name 21 1070          Goal Type Needed    Goal Type Needed  Dysphagia  -KG (r) ME (t) KG (c)        Subjective Comments    Subjective Comments  Patient was alert and ready for therapy. Patient reports that he believes his swallowing is improving but still has some difficulty.  -KG (r) ME (t) KG (c)        Dysphagia Goals    Dysphagia LTG's  Patient will safely consume the recommended diet without complications such as aspiration pneumonia  -KG (r) ME (t) KG (c)     Patient will safely consume the recommended diet without complications such as aspiration pneumonia  Mech soft due to dentition and thin liquids  -KG (r) ME (t) KG (c)     Status: Patient will safely consume the recommended diet without complications such as aspiration pneumonia  Progressing as expected  -KG (r) ME (t) KG (c)     Comments: Patient will safely consume the recommended diet without complications such as aspiration pneumonia  Patient reported that the water \"went down the wrong pipe\" 3 times during ESTIM today. He reported that this happens once a day or once every other day with liquids and once a week with food.  -KG (r) ME (t) KG (c)     Dysphagia STG's  Patient will improve tongue elevation to enhance safety and increase eating efficiency through lingual elevation;Patient will improve oral skills to enhance safety and increase eating efficiency by increasing accuracy of A-P tongue movements;Patient " will increase strength of tongue base and posterior pharyngeal walls to reduce residue that might fall into airway by completing  -KG (r) ME (t) KG (c)     Patient will improve tongue elevation to enhance safety and increase eating efficiency through lingual elevation  with intermittent cues  -KG (r) ME (t) KG (c)     Status: Patient will improve tongue elevation to enhance safety and increase eating efficiency through lingual elevation  Progressing as expected  -KG (r) ME (t) KG (c)     Comments: Patient will improve tongue elevation to enhance safety and increase eating efficiency through lingual elevation  Completing exercise at home per patient report.  -KG (r) ME (t) KG (c)     Patient will improve oral skills to enhance safety and increase eating efficiency by increasing accuracy of A-P tongue movements  with intermittent cues  -KG (r) ME (t) KG (c)     Status: Patient will improve oral skills to enhance safety and increase eating efficiency by increasing accuracy of A-P tongue movements  Progressing as expected  -KG (r) ME (t) KG (c)     Comments: Patient will improve oral skills to enhance safety and increase eating efficiency by increasing accuracy of A-P tongue movements  Completing at home per patient report.  -KG (r) ME (t) KG (c)     Patient will improve stage transition duration of swallow/improve timing to reduce food falling into the airway by decreasing swallow delay after neurosensory  stimulation  neurosensory stimulation  -KG (r) ME (t) KG (c)     Status: Patient will improve stage transition duration of swallow/improve timing to reduce food falling into the airway by decreasing swallow delay after neurosensory  stimulation  Progressing as expected  -KG (r) ME (t) KG (c)     Comments: Patient will improve stage transition duration of swallow/improve timing to reduce food falling into the airway by decreasing swallow delay after neurosensory  stimulation  Patient was able to demonstrate swallows  with contraction. Noted moderate coughing during ESTIM after a swallow. He required sips of water intermittently when a swallow was more difficult.   -KG (r) ME (t) KG (c)     Patient will increase strength of tongue base and posterior pharyngeal walls to reduce residue that might fall into airway by completing  effotful swallow  -KG (r) ME (t) KG (c)     Status: Patient will increase strength of tongue base and posterior pharyngeal walls to reduce residue that might fall into airway by completing  Progressing as expected  -KG (r) ME (t) KG (c)     Comments: Patient will increase strength of tongue base and posterior pharyngeal walls to reduce residue that might fall into airway by completing  Patient completed effortful swallows with A1 level 16 estim today.   -KG (r) ME (t) KG (c)        SLP Time Calculation    SLP Goal Re-Cert Due Date  04/18/21  -KG (r) ME (t) KG (c)       User Key  (r) = Recorded By, (t) = Taken By, (c) = Cosigned By    Initials Name Provider Type    Yuni Prakash CCC-SLP Speech and Language Pathologist    ME Merna Paris, Speech Therapy Student Speech Therapy Student          OP SLP Education     Row Name 02/25/21 1400       Education    Barriers to Learning  No barriers identified  -KG (r) ME (t) KG (c)      User Key  (r) = Recorded By, (t) = Taken By, (c) = Cosigned By    Initials Name Effective Dates    Yuni Prakash CCC-SLP 02/11/20 -     ME Merna Paris Speech Therapy Student 12/11/20 -           OP SLP Assessment/Plan - 02/25/21 1400        SLP Assessment    Functional Problems  Swallowing   -KG (r) ME (t) KG (c)    Clinical Impression Comments  Patient able to complete effortful swallows with ESTIM. Noted moderate coughing during ESTIM. Patient reported that his swallowing is improving but he still has some difficulty.   -KG (r) ME (t) KG (c)       SLP Plan    Plan Comments  Continue therapy and home exercises.   -KG (r) ME (t) KG (c)      User Key  (r) = Recorded By, (t) =  Taken By, (c) = Cosigned By    Initials Name Provider Type    Yuni Prakash, CCC-SLP Speech and Language Pathologist    ME Merna Paris, Speech Therapy Student Speech Therapy Student                Time Calculation:                     Yuni Castro CCC-SLP  2/25/2021

## 2021-03-02 ENCOUNTER — TREATMENT (OUTPATIENT)
Dept: PHYSICAL THERAPY | Facility: CLINIC | Age: 61
End: 2021-03-02

## 2021-03-02 DIAGNOSIS — R13.14 PHARYNGOESOPHAGEAL DYSPHAGIA: Primary | ICD-10-CM

## 2021-03-02 PROCEDURE — 92526 ORAL FUNCTION THERAPY: CPT | Performed by: SPEECH-LANGUAGE PATHOLOGIST

## 2021-03-02 NOTE — PROGRESS NOTES
Outpatient Speech Language Pathology   Adult Swallow Treatment Note       Patient Name: Ryan Osborn  : 1960  MRN: [id number]  Today's Date: 3/2/2021         Visit Date: 2021   Patient Active Problem List   Diagnosis   • Odynophagia        Visit Dx:    ICD-10-CM ICD-9-CM   1. Pharyngoesophageal dysphagia  R13.14 787.24             I have reviewed and agree with the following documentation completed by Merna Paris, SLP .   Yuni Castro, CCC-SLP 3/2/2021 16:12 CST                  SLP OP Goals     Row Name 21 1400          Goal Type Needed    Goal Type Needed  Dysphagia  -KG (r) ME (t) KG (c)        Subjective Comments    Subjective Comments  Patient was alert and ready for therapy. He noted he had some coughing when swallowing since last session. Noted minimal coughing during ESTIM in session today.  -KG (r) ME (t) KG (c)        Dysphagia Goals    Dysphagia LTG's  Patient will safely consume the recommended diet without complications such as aspiration pneumonia  -KG (r) ME (t) KG (c)     Patient will safely consume the recommended diet without complications such as aspiration pneumonia  Mech soft due to dentition and thin liquids  -KG (r) ME (t) KG (c)     Status: Patient will safely consume the recommended diet without complications such as aspiration pneumonia  Progressing as expected  -KG (r) ME (t) KG (c)     Comments: Patient will safely consume the recommended diet without complications such as aspiration pneumonia  Noted patient coughing 2 times during ESTIM today. He reported that he had some coughing when swallowing since the last session.  -KG (r) ME (t) KG (c)     Dysphagia STG's  Patient will improve tongue elevation to enhance safety and increase eating efficiency through lingual elevation;Patient will improve oral skills to enhance safety and increase eating efficiency by increasing accuracy of A-P tongue movements;Patient will increase strength of tongue base  and posterior pharyngeal walls to reduce residue that might fall into airway by completing  -KG (r) ME (t) KG (c)     Patient will improve tongue elevation to enhance safety and increase eating efficiency through lingual elevation  with intermittent cues  -KG (r) ME (t) KG (c)     Status: Patient will improve tongue elevation to enhance safety and increase eating efficiency through lingual elevation  Progressing as expected  -KG (r) ME (t) KG (c)     Comments: Patient will improve tongue elevation to enhance safety and increase eating efficiency through lingual elevation  Completing exercise at home per patient report.  -KG (r) ME (t) KG (c)     Patient will improve oral skills to enhance safety and increase eating efficiency by increasing accuracy of A-P tongue movements  with intermittent cues  -KG (r) ME (t) KG (c)     Status: Patient will improve oral skills to enhance safety and increase eating efficiency by increasing accuracy of A-P tongue movements  Progressing as expected  -KG (r) ME (t) KG (c)     Comments: Patient will improve oral skills to enhance safety and increase eating efficiency by increasing accuracy of A-P tongue movements  Completing at home per patient report.  -KG (r) ME (t) KG (c)     Patient will improve stage transition duration of swallow/improve timing to reduce food falling into the airway by decreasing swallow delay after neurosensory  stimulation  neurosensory stimulation  -KG (r) ME (t) KG (c)     Status: Patient will improve stage transition duration of swallow/improve timing to reduce food falling into the airway by decreasing swallow delay after neurosensory  stimulation  Progressing as expected  -KG (r) ME (t) KG (c)     Comments: Patient will improve stage transition duration of swallow/improve timing to reduce food falling into the airway by decreasing swallow delay after neurosensory  stimulation  Patient was able to demonstrate swallows with contraction. Noted minimal  coughing during ESTIM after a swallow. He required sips of water intermittently when a swallow was more difficult.   -KG (r) ME (t) KG (c)     Patient will increase strength of tongue base and posterior pharyngeal walls to reduce residue that might fall into airway by completing  effotful swallow  -KG (r) ME (t) KG (c)     Status: Patient will increase strength of tongue base and posterior pharyngeal walls to reduce residue that might fall into airway by completing  Progressing as expected  -KG (r) ME (t) KG (c)     Comments: Patient will increase strength of tongue base and posterior pharyngeal walls to reduce residue that might fall into airway by completing  Patient completed effortful swallows with A1 level 16 estim today.   -KG (r) ME (t) KG (c)        SLP Time Calculation    SLP Goal Re-Cert Due Date  04/25/21  -KG (r) ME (t) KG (c)       User Key  (r) = Recorded By, (t) = Taken By, (c) = Cosigned By    Initials Name Provider Type    Yuni Prakash CCC-SLP Speech and Language Pathologist    ME Merna Paris Speech Therapy Student Speech Therapy Student          OP SLP Education     Row Name 03/02/21 1400       Education    Barriers to Learning  No barriers identified  -KG (r) ME (t) KG (c)      User Key  (r) = Recorded By, (t) = Taken By, (c) = Cosigned By    Initials Name Effective Dates    Yuni Prakash CCC-SLP 02/11/20 -     Merna Castaneda Speech Therapy Student 12/11/20 -           OP SLP Assessment/Plan - 03/02/21 1400        SLP Assessment    Functional Problems  Swallowing   -KG (r) ME (t) KG (c)    Clinical Impression Comments  Patient able to complete effortful swallows with ESTIM. Noted minimal coung during ESTIM.   -KG (r) ME (t) KG (c)       SLP Plan    Plan Comments  Continue therapy and home exercises.   -KG (r) ME (t) KG (c)      User Key  (r) = Recorded By, (t) = Taken By, (c) = Cosigned By    Initials Name Provider Type    Yuni Prakash CCC-SLP Speech and Language Pathologist     Merna Castaneda, Speech Therapy Student Speech Therapy Student                       Yuni Castro, CCC-SLP  3/2/2021

## 2021-03-03 RX ORDER — PANTOPRAZOLE SODIUM 40 MG/1
TABLET, DELAYED RELEASE ORAL
OUTPATIENT
Start: 2021-03-03

## 2021-03-03 RX ORDER — LISINOPRIL 40 MG/1
TABLET ORAL
OUTPATIENT
Start: 2021-03-03

## 2021-03-03 NOTE — TELEPHONE ENCOUNTER
Caller: [facility] - [place], [place] - [address] - [phone] [facility] [phone] FX    Relationship: Pharmacy    Best call back number: [phone]    Medication needed:   Requested Prescriptions     Pending Prescriptions Disp Refills   • lisinopril (PRINIVIL,ZESTRIL) 40 MG tablet      • pantoprazole (Protonix) 40 MG EC tablet          When do you need the refill by: 3/3/21    Does the patient have less than a 3 day supply:  [] Yes  [x] No    What is the patient's preferred pharmacy: [facility] - [place], [place] - [address]  [address] - [phone] [facility] [phone] FX

## 2021-03-04 ENCOUNTER — TREATMENT (OUTPATIENT)
Dept: PHYSICAL THERAPY | Facility: CLINIC | Age: 61
End: 2021-03-04

## 2021-03-04 DIAGNOSIS — R13.14 PHARYNGOESOPHAGEAL DYSPHAGIA: Primary | ICD-10-CM

## 2021-03-04 PROCEDURE — 92526 ORAL FUNCTION THERAPY: CPT | Performed by: SPEECH-LANGUAGE PATHOLOGIST

## 2021-03-04 NOTE — PROGRESS NOTES
Outpatient Speech Language Pathology   Adult Swallow Treatment Note       Patient Name: Ryan Osborn  : 1960  MRN: [id number]  Today's Date: 3/4/2021         Visit Date: 2021   Patient Active Problem List   Diagnosis   • Odynophagia        Visit Dx:    ICD-10-CM ICD-9-CM   1. Pharyngoesophageal dysphagia  R13.14 787.24           I have reviewed and agree with the following documentation completed by Merna Paris, SLP .   Yuni Castro, CCC-SLP 3/4/2021 14:50 CST                    SLP OP Goals     Row Name 21 1415          Goal Type Needed    Goal Type Needed  Dysphagia  -KG (r) ME (t) KG (c)        Subjective Comments    Subjective Comments  Patient was alert and ready for therapy. Reduction in amount of coughing noted during ESTIM.  -KG (r) ME (t) KG (c)        Dysphagia Goals    Dysphagia LTG's  Patient will safely consume the recommended diet without complications such as aspiration pneumonia  -KG (r) ME (t) KG (c)     Patient will safely consume the recommended diet without complications such as aspiration pneumonia  Mech soft due to dentition and thin liquids  -KG (r) ME (t) KG (c)     Status: Patient will safely consume the recommended diet without complications such as aspiration pneumonia  Progressing as expected  -KG (r) ME (t) KG (c)     Comments: Patient will safely consume the recommended diet without complications such as aspiration pneumonia  Noted patient coughing 1 time during ESTIM today.  -KG (r) ME (t) KG (c)     Dysphagia STG's  Patient will improve tongue elevation to enhance safety and increase eating efficiency through lingual elevation;Patient will improve oral skills to enhance safety and increase eating efficiency by increasing accuracy of A-P tongue movements;Patient will increase strength of tongue base and posterior pharyngeal walls to reduce residue that might fall into airway by completing  -KG (r) ME (t) KG (c)     Patient will improve tongue  elevation to enhance safety and increase eating efficiency through lingual elevation  with intermittent cues  -KG (r) ME (t) KG (c)     Status: Patient will improve tongue elevation to enhance safety and increase eating efficiency through lingual elevation  Progressing as expected  -KG (r) ME (t) KG (c)     Comments: Patient will improve tongue elevation to enhance safety and increase eating efficiency through lingual elevation  Completing exercise at home per patient report.  -KG (r) ME (t) KG (c)     Patient will improve oral skills to enhance safety and increase eating efficiency by increasing accuracy of A-P tongue movements  with intermittent cues  -KG (r) ME (t) KG (c)     Status: Patient will improve oral skills to enhance safety and increase eating efficiency by increasing accuracy of A-P tongue movements  Progressing as expected  -KG (r) ME (t) KG (c)     Comments: Patient will improve oral skills to enhance safety and increase eating efficiency by increasing accuracy of A-P tongue movements  Completing at home per patient report.  -KG (r) ME (t) KG (c)     Patient will improve stage transition duration of swallow/improve timing to reduce food falling into the airway by decreasing swallow delay after neurosensory  stimulation  neurosensory stimulation  -KG (r) ME (t) KG (c)     Status: Patient will improve stage transition duration of swallow/improve timing to reduce food falling into the airway by decreasing swallow delay after neurosensory  stimulation  Progressing as expected  -KG (r) ME (t) KG (c)     Comments: Patient will improve stage transition duration of swallow/improve timing to reduce food falling into the airway by decreasing swallow delay after neurosensory  stimulation  Patient was able to demonstrate swallows with contraction. Noted coughing 1 time during ESTIM after a swallow. He required sips of water intermittently when a swallow was more difficult.  -KG (r) ME (t) KG (c)     Patient will  increase strength of tongue base and posterior pharyngeal walls to reduce residue that might fall into airway by completing  effotful swallow  -KG (r) ME (t) KG (c)     Status: Patient will increase strength of tongue base and posterior pharyngeal walls to reduce residue that might fall into airway by completing  Progressing as expected  -KG (r) ME (t) KG (c)     Comments: Patient will increase strength of tongue base and posterior pharyngeal walls to reduce residue that might fall into airway by completing  Patient completed effortful swallows with A1 level 16 estim today.   -KG (r) ME (t) KG (c)        SLP Time Calculation    SLP Goal Re-Cert Due Date  04/27/21  -KG (r) ME (t) KG (c)       User Key  (r) = Recorded By, (t) = Taken By, (c) = Cosigned By    Initials Name Provider Type    Yuni Prakash CCC-SLP Speech and Language Pathologist    Merna Castaneda, Speech Therapy Student Speech Therapy Student          OP SLP Education     Row Name 03/04/21 1400       Education    Barriers to Learning  No barriers identified  -KG (r) ME (t) KG (c)      User Key  (r) = Recorded By, (t) = Taken By, (c) = Cosigned By    Initials Name Effective Dates    Yuni Prakash CCC-SLP 02/11/20 -     ME Merna Paris Speech Therapy Student 12/11/20 -           OP SLP Assessment/Plan - 03/04/21 1400        SLP Assessment    Functional Problems  Swallowing   -KG (r) ME (t) KG (c)    Clinical Impression Comments  Patient able to complete effortful swallows with ESTIM. Noted patient coughing 1 time during ESTIM.   -KG (r) ME (t) KG (c)       SLP Plan    Plan Comments  Continue therapy and home exercises.   -KG (r) ME (t) KG (c)      User Key  (r) = Recorded By, (t) = Taken By, (c) = Cosigned By    Initials Name Provider Type    Yuni Prakash CCC-SLP Speech and Language Pathologist    Merna Castaneda, Speech Therapy Student Speech Therapy Student                Time Calculation:                     Yuni Castro  CCC-SLP  3/4/2021

## 2021-03-08 RX ORDER — LISINOPRIL 40 MG/1
TABLET ORAL
OUTPATIENT
Start: 2021-03-08

## 2021-03-08 RX ORDER — PANTOPRAZOLE SODIUM 40 MG/1
TABLET, DELAYED RELEASE ORAL
OUTPATIENT
Start: 2021-03-08

## 2021-03-08 NOTE — TELEPHONE ENCOUNTER
Caller: [facility] [address] - [phone] [facility] [phone] FX    Relationship: Pharmacy    Best call back number:   FX (Pharmacy) [phone]          Medication needed:   Requested Prescriptions     Pending Prescriptions Disp Refills   • lisinopril (PRINIVIL,ZESTRIL) 40 MG tablet     • pantoprazole (Protonix) 40 MG EC tablet         When do you need the refill by: 3.12.21    What details did the patient provide when requesting the medication:     Does the patient have less than a 3 day supply:  [] Yes  [x] No    What is the patient's preferred pharmacy: [facility] [address]  [address] - [phone] [facility] [phone] [place]

## 2021-03-09 ENCOUNTER — TREATMENT (OUTPATIENT)
Dept: PHYSICAL THERAPY | Facility: CLINIC | Age: 61
End: 2021-03-09

## 2021-03-09 DIAGNOSIS — R13.14 PHARYNGOESOPHAGEAL DYSPHAGIA: Primary | ICD-10-CM

## 2021-03-09 PROCEDURE — 92526 ORAL FUNCTION THERAPY: CPT | Performed by: SPEECH-LANGUAGE PATHOLOGIST

## 2021-03-09 NOTE — PROGRESS NOTES
Outpatient Speech Language Pathology   Adult Swallow Treatment Note/Discharge Summary       Patient Name: Ryan Osborn  : 1960  MRN: [id number]  Today's Date: 3/9/2021         Visit Date: 2021   Patient Active Problem List   Diagnosis   • Odynophagia        Visit Dx:    ICD-10-CM ICD-9-CM   1. Pharyngoesophageal dysphagia  R13.14 787.24             Ryan Osborn has completed a course of therapy for dysphagia. Swallow has improved. He is able to demonstrate all exercises and is continuing to complete them at home. He agreed to discharge today. He is certainly welcome to return for further evaluation and therapy in the future should conditions warrant.   Thank you for this referral and for allowing us to participate in the care of this patient.        SLP OP Goals     Row Name 21 1335          Goal Type Needed    Goal Type Needed  Dysphagia  -KG        Subjective Comments    Subjective Comments  Patient was alert and ready for therapy today. He feels his swallow has improved and agreed to discharge today.    -KG        Dysphagia Goals    Dysphagia LTG's  Patient will safely consume the recommended diet without complications such as aspiration pneumonia  -KG     Patient will safely consume the recommended diet without complications such as aspiration pneumonia  Mech soft due to dentition and thin liquids  -KG     Status: Patient will safely consume the recommended diet without complications such as aspiration pneumonia  Achieved  -KG     Comments: Patient will safely consume the recommended diet without complications such as aspiration pneumonia  Patient feels his swallow has improved. He still coughs from time to time but no choking or other s/s of aspiration.   -KG     Dysphagia STG's  Patient will improve tongue elevation to enhance safety and increase eating efficiency through lingual elevation;Patient will improve oral skills to enhance safety and increase eating efficiency by increasing  accuracy of A-P tongue movements;Patient will increase strength of tongue base and posterior pharyngeal walls to reduce residue that might fall into airway by completing  -KG     Patient will improve tongue elevation to enhance safety and increase eating efficiency through lingual elevation  with intermittent cues  -KG     Status: Patient will improve tongue elevation to enhance safety and increase eating efficiency through lingual elevation  Achieved  -KG     Comments: Patient will improve tongue elevation to enhance safety and increase eating efficiency through lingual elevation  Able to demonstrate. Completing exercise at home per patient report.  -KG     Patient will improve oral skills to enhance safety and increase eating efficiency by increasing accuracy of A-P tongue movements  with intermittent cues  -KG     Status: Patient will improve oral skills to enhance safety and increase eating efficiency by increasing accuracy of A-P tongue movements  Achieved  -KG     Comments: Patient will improve oral skills to enhance safety and increase eating efficiency by increasing accuracy of A-P tongue movements  Able to demonstrate. Completing at home per patient report.  -KG     Patient will improve stage transition duration of swallow/improve timing to reduce food falling into the airway by decreasing swallow delay after neurosensory  stimulation  neurosensory stimulation  -KG     Status: Patient will improve stage transition duration of swallow/improve timing to reduce food falling into the airway by decreasing swallow delay after neurosensory  stimulation  Achieved  -KG     Comments: Patient will improve stage transition duration of swallow/improve timing to reduce food falling into the airway by decreasing swallow delay after neurosensory  stimulation  Completed course of estim for swallowing.   -KG     Patient will increase strength of tongue base and posterior pharyngeal walls to reduce residue that might fall  into airway by completing  effotful swallow  -KG     Status: Patient will increase strength of tongue base and posterior pharyngeal walls to reduce residue that might fall into airway by completing  Achieved  -KG     Comments: Patient will increase strength of tongue base and posterior pharyngeal walls to reduce residue that might fall into airway by completing  Patient able to demonstrate. Completing at home per patient report.   -KG        SLP Time Calculation    SLP Goal Re-Cert Due Date  04/27/21  -KG       User Key  (r) = Recorded By, (t) = Taken By, (c) = Cosigned By    Initials Name Provider Type    Yuni Prakash CCC-SLP Speech and Language Pathologist          OP SLP Education     Row Name 03/09/21 1300       Education    Barriers to Learning  No barriers identified  -KG    Education Comments  Educated patient on home exercises and oral care. Patient agreed to discharge.   -KG      User Key  (r) = Recorded By, (t) = Taken By, (c) = Cosigned By    Initials Name Effective Dates    Yuni Prakash CCC-SLP 02/11/20 -           OP SLP Assessment/Plan - 03/09/21 1300        SLP Assessment    Functional Problems  Swallowing   -KG    Clinical Impression Comments  Patient able to demonstrate all exercises and agreed to discharge today.    -KG       SLP Plan    Plan Comments  Discharge from therapy and continue home exercises.    -KG      User Key  (r) = Recorded By, (t) = Taken By, (c) = Cosigned By    Initials Name Provider Type    Yuni Prakash CCC-SLP Speech and Language Pathologist              TYLER Marr  3/9/2021

## 2021-03-16 RX ORDER — LISINOPRIL 40 MG/1
TABLET ORAL
OUTPATIENT
Start: 2021-03-16

## 2021-03-16 RX ORDER — PANTOPRAZOLE SODIUM 40 MG/1
TABLET, DELAYED RELEASE ORAL
OUTPATIENT
Start: 2021-03-16

## 2021-03-16 NOTE — TELEPHONE ENCOUNTER
Caller: [facility] [address]  [address] - [phone] PH - [phone] FX    Relationship: Pharmacy    Best call back number: [phone]    Medication needed:   Requested Prescriptions     Pending Prescriptions Disp Refills   • lisinopril (PRINIVIL,ZESTRIL) 40 MG tablet     • pantoprazole (Protonix) 40 MG EC tablet         What is the patient's preferred pharmacy: [place] [address]  [address] - [phone] PH - [phone] FX

## 2021-03-26 ENCOUNTER — OFFICE VISIT (OUTPATIENT)
Dept: FAMILY MEDICINE CLINIC | Facility: CLINIC | Age: 61
End: 2021-03-26

## 2021-03-26 VITALS
DIASTOLIC BLOOD PRESSURE: 78 MMHG | SYSTOLIC BLOOD PRESSURE: 124 MMHG | BODY MASS INDEX: 29.82 KG/M2 | WEIGHT: 225 LBS | HEIGHT: 73 IN | HEART RATE: 60 BPM | TEMPERATURE: 98 F | RESPIRATION RATE: 20 BRPM

## 2021-03-26 DIAGNOSIS — I10 ESSENTIAL HYPERTENSION: Primary | ICD-10-CM

## 2021-03-26 DIAGNOSIS — F20.0 PARANOID SCHIZOPHRENIA (HCC): ICD-10-CM

## 2021-03-26 PROBLEM — G25.71 AKATHISIA: Status: ACTIVE | Noted: 2017-02-15

## 2021-03-26 PROBLEM — F31.9 BIPOLAR DISORDER: Status: ACTIVE | Noted: 2021-03-26

## 2021-03-26 PROBLEM — R53.83 FATIGUE: Status: ACTIVE | Noted: 2021-03-26

## 2021-03-26 PROBLEM — F43.10 PTSD (POST-TRAUMATIC STRESS DISORDER): Status: ACTIVE | Noted: 2021-03-26

## 2021-03-26 PROBLEM — G47.33 OBSTRUCTIVE SLEEP APNEA: Status: ACTIVE | Noted: 2020-07-03

## 2021-03-26 PROBLEM — E55.9 VITAMIN D DEFICIENCY: Status: ACTIVE | Noted: 2021-03-26

## 2021-03-26 PROBLEM — F41.9 ANXIETY: Status: ACTIVE | Noted: 2021-03-26

## 2021-03-26 PROBLEM — R41.82 ALTERED MENTAL STATUS: Status: ACTIVE | Noted: 2021-03-26

## 2021-03-26 PROBLEM — G47.00 INSOMNIA: Status: ACTIVE | Noted: 2021-03-26

## 2021-03-26 PROBLEM — Z99.89 CPAP (CONTINUOUS POSITIVE AIRWAY PRESSURE) DEPENDENCE: Status: ACTIVE | Noted: 2020-07-20

## 2021-03-26 PROBLEM — N39.44 NOCTURNAL ENURESIS: Status: ACTIVE | Noted: 2021-03-26

## 2021-03-26 PROCEDURE — 99213 OFFICE O/P EST LOW 20 MIN: CPT | Performed by: NURSE PRACTITIONER

## 2021-03-26 RX ORDER — LISINOPRIL 40 MG/1
40 TABLET ORAL EVERY MORNING
Qty: 30 TABLET | Refills: 3 | Status: SHIPPED | OUTPATIENT
Start: 2021-03-26 | End: 2021-08-02

## 2021-03-26 RX ORDER — FLUOXETINE HYDROCHLORIDE 20 MG/1
20 CAPSULE ORAL EVERY MORNING
COMMUNITY
Start: 2021-02-24

## 2021-03-26 RX ORDER — AMANTADINE HYDROCHLORIDE 100 MG/1
100 CAPSULE, GELATIN COATED ORAL 3 TIMES DAILY
COMMUNITY

## 2021-03-26 NOTE — PROGRESS NOTES
"Chief Complaint  Hypertension    Subjective    History of Present Illness      Patient presents to [facility] for   Pt states that he is here for a f/u with hypertension and says he does not have a blood pressure cuff to check  blood pressure with. Opt is unaware of what medications he take,a list was obtained from [employer].    Hypertension  This is a chronic problem.        Review of Systems   Cardiovascular: Negative.        I have reviewed and agree with the HPI and ROS information as above.  ABBY Blackwood     Objective   Vital Signs:   /78   Pulse 60   Temp 98 °F (36.7 °C)   Resp 20   Ht 185.4 cm (73\")   Wt 102 kg (225 lb)   BMI 29.69 kg/m²       Physical Exam  Constitutional:       Appearance: Normal appearance. He is well-developed.   HENT:      Head: Normocephalic and atraumatic.      Right Ear: External ear normal.      Left Ear: External ear normal.      Nose: Nose normal. No nasal tenderness or congestion.      Mouth/Throat:      Lips: Pink. No lesions.      Mouth: Mucous membranes are moist. No oral lesions.      Dentition: Normal dentition.      Pharynx: Oropharynx is clear. No pharyngeal swelling, oropharyngeal exudate or posterior oropharyngeal erythema.   Eyes:      General: Lids are normal. Vision grossly intact. No scleral icterus.        Right eye: No discharge.         Left eye: No discharge.      Extraocular Movements: Extraocular movements intact.      Conjunctiva/sclera: Conjunctivae normal.      Right eye: Right conjunctiva is not injected.      Left eye: Left conjunctiva is not injected.      Pupils: Pupils are equal, round, and reactive to light.   Cardiovascular:      Rate and Rhythm: Normal rate and regular rhythm.      Heart sounds: Normal heart sounds. No murmur heard.   No gallop.    Pulmonary:      Effort: Pulmonary effort is normal.      Breath sounds: Normal breath sounds and air entry. No wheezing, rhonchi or " rales.   Musculoskeletal:         General: No tenderness or deformity. Normal range of motion.      Cervical back: Full passive range of motion without pain, normal range of motion and neck supple.      Right lower leg: No edema.      Left lower leg: No edema.   Skin:     General: Skin is warm and dry.      Coloration: Skin is not jaundiced.      Findings: No rash.   Neurological:      Mental Status: He is alert and oriented to person, place, and time.      Cranial Nerves: Cranial nerves are intact.      Sensory: Sensation is intact.      Motor: Motor function is intact.      Coordination: Coordination is intact.      Gait: Gait is intact.   Psychiatric:         Attention and Perception: Attention normal.         Mood and Affect: Mood and affect normal.         Behavior: Behavior is not hyperactive. Behavior is cooperative.         Thought Content: Thought content normal.         Judgment: Judgment normal.          Result Review  Data Reviewed:                   Assessment and Plan        Problem List Items Addressed This Visit        Cardiac and Vasculature    Hypertensive disorder - Primary       Mental Health    Paranoid schizophrenia (CMS/HCC)    Relevant Medications    FLUoxetine (PROzac) 20 MG capsule      Patient comes in today requesting refills on his lisinopril.  He tried to pick them up at the pharmacy and they stated that he did not have any more.  Unfortunately patient is not a good historian.  His brother who apparently is a caretaker dropped him off and was coming back to get him but was not here during the visit.  We had to get the medication list from the pharmacy.  When asking different questions about who he is followed with for different things, etc. patient is unable to communicate.  I recommended that his brother be with him in person for future visits.  Will attempt to get a wellness exam in 4 months when he would be due for lab work anyway.  Follow-up before then as needed.        Follow Up    Return in about 4 months (around 7/26/2021).  Patient was given instructions and counseling regarding his condition or for health maintenance advice. Please see specific information pulled into the AVS if appropriate.

## 2021-07-09 ENCOUNTER — OFFICE VISIT (OUTPATIENT)
Dept: FAMILY MEDICINE CLINIC | Facility: CLINIC | Age: 61
End: 2021-07-09

## 2021-07-09 ENCOUNTER — LAB (OUTPATIENT)
Dept: LAB | Facility: HOSPITAL | Age: 61
End: 2021-07-09

## 2021-07-09 VITALS
HEIGHT: 72 IN | SYSTOLIC BLOOD PRESSURE: 144 MMHG | RESPIRATION RATE: 18 BRPM | DIASTOLIC BLOOD PRESSURE: 88 MMHG | HEART RATE: 64 BPM | TEMPERATURE: 98 F | WEIGHT: 221.38 LBS | BODY MASS INDEX: 29.99 KG/M2 | OXYGEN SATURATION: 95 %

## 2021-07-09 DIAGNOSIS — R13.14 PHARYNGOESOPHAGEAL DYSPHAGIA: ICD-10-CM

## 2021-07-09 DIAGNOSIS — F20.0 PARANOID SCHIZOPHRENIA (HCC): ICD-10-CM

## 2021-07-09 DIAGNOSIS — Z00.00 WELL ADULT HEALTH CHECK: ICD-10-CM

## 2021-07-09 DIAGNOSIS — Z12.5 SCREENING FOR MALIGNANT NEOPLASM OF PROSTATE: ICD-10-CM

## 2021-07-09 DIAGNOSIS — Z12.11 ENCOUNTER FOR SCREENING COLONOSCOPY: ICD-10-CM

## 2021-07-09 DIAGNOSIS — K08.9 POOR DENTITION: ICD-10-CM

## 2021-07-09 DIAGNOSIS — Z00.00 WELL ADULT HEALTH CHECK: Primary | ICD-10-CM

## 2021-07-09 DIAGNOSIS — E66.1 CLASS 1 DRUG-INDUCED OBESITY WITH BODY MASS INDEX (BMI) OF 30.0 TO 30.9 IN ADULT, UNSPECIFIED WHETHER SERIOUS COMORBIDITY PRESENT: ICD-10-CM

## 2021-07-09 LAB
ALBUMIN SERPL-MCNC: 4.3 G/DL (ref 3.5–5)
ALBUMIN/GLOB SERPL: 1.3 G/DL (ref 1.1–2.5)
ALP SERPL-CCNC: 95 U/L (ref 24–120)
ALT SERPL W P-5'-P-CCNC: 22 U/L (ref 0–50)
ANION GAP SERPL CALCULATED.3IONS-SCNC: 6 MMOL/L (ref 4–13)
AST SERPL-CCNC: 29 U/L (ref 7–45)
AUTO MIXED CELLS #: 1.1 10*3/MM3 (ref 0.1–2.6)
AUTO MIXED CELLS %: 16.4 % (ref 0.1–24)
BILIRUB SERPL-MCNC: 0.3 MG/DL (ref 0.1–1)
BILIRUB UR QL STRIP: NEGATIVE
BUN SERPL-MCNC: 12 MG/DL (ref 5–21)
BUN/CREAT SERPL: 12
CALCIUM SPEC-SCNC: 9.4 MG/DL (ref 8.4–10.4)
CHLORIDE SERPL-SCNC: 99 MMOL/L (ref 98–110)
CHOLEST SERPL-MCNC: 146 MG/DL (ref 130–200)
CLARITY UR: CLEAR
CO2 SERPL-SCNC: 32 MMOL/L (ref 24–31)
COLOR UR: YELLOW
CREAT SERPL-MCNC: 1 MG/DL (ref 0.5–1.4)
ERYTHROCYTE [DISTWIDTH] IN BLOOD BY AUTOMATED COUNT: 12.7 % (ref 12.3–15.4)
GFR SERPL CREATININE-BSD FRML MDRD: 76 ML/MIN/1.73
GLOBULIN UR ELPH-MCNC: 3.4 GM/DL
GLUCOSE SERPL-MCNC: 108 MG/DL (ref 70–100)
GLUCOSE UR STRIP-MCNC: NEGATIVE MG/DL
HCT VFR BLD AUTO: 37.4 % (ref 37.5–51)
HDLC SERPL-MCNC: 51 MG/DL
HGB BLD-MCNC: 13 G/DL (ref 13–17.7)
HGB UR QL STRIP.AUTO: NEGATIVE
KETONES UR QL STRIP: NEGATIVE
LDLC SERPL CALC-MCNC: 76 MG/DL (ref 0–99)
LDLC/HDLC SERPL: 1.46 {RATIO}
LEUKOCYTE ESTERASE UR QL STRIP.AUTO: NEGATIVE
LYMPHOCYTES # BLD AUTO: 3.2 10*3/MM3 (ref 0.7–3.1)
LYMPHOCYTES NFR BLD AUTO: 49.1 % (ref 19.6–45.3)
MCH RBC QN AUTO: 30.7 PG (ref 26.6–33)
MCHC RBC AUTO-ENTMCNC: 34.8 G/DL (ref 31.5–35.7)
MCV RBC AUTO: 88.2 FL (ref 79–97)
NEUTROPHILS NFR BLD AUTO: 2.2 10*3/MM3 (ref 1.7–7)
NEUTROPHILS NFR BLD AUTO: 34.5 % (ref 42.7–76)
NITRITE UR QL STRIP: NEGATIVE
PH UR STRIP.AUTO: 6 [PH] (ref 5–8)
PLATELET # BLD AUTO: 269 10*3/MM3 (ref 140–450)
PMV BLD AUTO: 7.8 FL (ref 6–12)
POTASSIUM SERPL-SCNC: 4.9 MMOL/L (ref 3.5–5.3)
PROT SERPL-MCNC: 7.7 G/DL (ref 6.3–8.7)
PROT UR QL STRIP: NEGATIVE
RBC # BLD AUTO: 4.24 10*6/MM3 (ref 4.14–5.8)
SODIUM SERPL-SCNC: 137 MMOL/L (ref 135–145)
SP GR UR STRIP: 1.01 (ref 1–1.03)
TRIGL SERPL-MCNC: 102 MG/DL (ref 0–149)
UROBILINOGEN UR QL STRIP: NORMAL
VLDLC SERPL-MCNC: 19 MG/DL (ref 5–40)
WBC # BLD AUTO: 6.5 10*3/MM3 (ref 3.4–10.8)

## 2021-07-09 PROCEDURE — 81003 URINALYSIS AUTO W/O SCOPE: CPT

## 2021-07-09 PROCEDURE — 85025 COMPLETE CBC W/AUTO DIFF WBC: CPT

## 2021-07-09 PROCEDURE — 80061 LIPID PANEL: CPT

## 2021-07-09 PROCEDURE — 80050 GENERAL HEALTH PANEL: CPT

## 2021-07-09 PROCEDURE — 36415 COLL VENOUS BLD VENIPUNCTURE: CPT

## 2021-07-09 PROCEDURE — 80053 COMPREHEN METABOLIC PANEL: CPT

## 2021-07-09 PROCEDURE — 99396 PREV VISIT EST AGE 40-64: CPT | Performed by: NURSE PRACTITIONER

## 2021-07-09 PROCEDURE — G0103 PSA SCREENING: HCPCS

## 2021-07-09 PROCEDURE — 93000 ELECTROCARDIOGRAM COMPLETE: CPT | Performed by: NURSE PRACTITIONER

## 2021-07-09 RX ORDER — CLOSTRIDIUM TETANI TOXOID ANTIGEN (FORMALDEHYDE INACTIVATED), CORYNEBACTERIUM DIPHTHERIAE TOXOID ANTIGEN (FORMALDEHYDE INACTIVATED), BORDETELLA PERTUSSIS TOXOID ANTIGEN (GLUTARALDEHYDE INACTIVATED), BORDETELLA PERTUSSIS FILAMENTOUS HEMAGGLUTININ ANTIGEN (FORMALDEHYDE INACTIVATED), BORDETELLA PERTUSSIS PERTACTIN ANTIGEN, AND BORDETELLA PERTUSSIS FIMBRIAE 2/3 ANTIGEN 5; 2; 2.5; 5; 3; 5 [LF]/.5ML; [LF]/.5ML; UG/.5ML; UG/.5ML; UG/.5ML; UG/.5ML
0.5 INJECTION, SUSPENSION INTRAMUSCULAR ONCE
Qty: 0.5 ML | Refills: 0 | Status: SHIPPED | OUTPATIENT
Start: 2021-07-09 | End: 2021-07-09

## 2021-07-09 NOTE — PROGRESS NOTES
"Chief Complaint  Wellness Check (Needs labs, wellness and dental clearance. )    Subjective          Ryan Osborn presents to [facility]  Patient presents for annual wellness, labs and will need dental clearance forms filled out and faxed in for him to have multiple extractions.  Patient's brother is here with him today as historian.      Objective   Vital Signs:   /88 (BP Location: Right arm, Patient Position: Sitting, Cuff Size: Adult)   Pulse 64   Temp 98 °F (36.7 °C) (Infrared)   Resp 18   Ht 182.9 cm (72\")   Wt 100 kg (221 lb 6 oz)   SpO2 95%   BMI 30.02 kg/m²     Physical Exam  Constitutional:       Appearance: He is well-developed. He is obese.   HENT:      Head: Normocephalic and atraumatic.      Right Ear: Tympanic membrane, ear canal and external ear normal.      Left Ear: Tympanic membrane, ear canal and external ear normal.      Nose: Nose normal. No septal deviation, nasal tenderness or congestion.      Mouth/Throat:      Lips: Pink. No lesions.      Mouth: Mucous membranes are moist. No oral lesions.      Dentition: Abnormal dentition.      Pharynx: Oropharynx is clear. No pharyngeal swelling, oropharyngeal exudate or posterior oropharyngeal erythema.   Eyes:      General: Lids are normal. Vision grossly intact. No scleral icterus.        Right eye: No discharge.         Left eye: No discharge.      Extraocular Movements: Extraocular movements intact.      Conjunctiva/sclera: Conjunctivae normal.      Right eye: Right conjunctiva is not injected.      Left eye: Left conjunctiva is not injected.      Pupils: Pupils are equal, round, and reactive to light.   Neck:      Thyroid: No thyroid mass.      Trachea: Trachea normal.   Cardiovascular:      Rate and Rhythm: Regular rhythm. Bradycardia present.      Heart sounds: Normal heart sounds. No murmur heard.   No gallop.       Comments: Mild, HR high 50's   Pulmonary:      Effort: Pulmonary effort is normal. "      Breath sounds: Normal breath sounds and air entry. No wheezing, rhonchi or rales.   Abdominal:      General: There is no distension.      Palpations: Abdomen is soft. There is no mass.      Tenderness: There is no abdominal tenderness. There is no right CVA tenderness, left CVA tenderness, guarding or rebound.   Musculoskeletal:         General: No tenderness or deformity. Normal range of motion.      Cervical back: Full passive range of motion without pain, normal range of motion and neck supple.      Thoracic back: Normal.      Right lower leg: No edema.      Left lower leg: No edema.   Skin:     General: Skin is warm and dry.      Coloration: Skin is not jaundiced.      Findings: No rash.   Neurological:      Mental Status: He is alert and oriented to person, place, and time.      Cranial Nerves: Cranial nerves are intact.      Sensory: Sensation is intact.      Motor: Motor function is intact.      Coordination: Coordination is intact.      Gait: Gait is intact.      Deep Tendon Reflexes: Reflexes are normal and symmetric.   Psychiatric:         Mood and Affect: Mood normal. Affect is flat.         Cognition and Memory: Memory is impaired.      Comments: Brother here with him today         Result Review :            ECG 12 Lead    Date/Time: 7/9/2021 3:45 PM  Performed by: Margot Grissom APRN  Authorized by: Margot Grissom APRN   Comparison: not compared with previous ECG   Rhythm: sinus rhythm  Rate: bradycardic  BPM: 57           UPPER GI ENDOSCOPY (09/04/2020 08:14)        Assessment and Plan    Diagnoses and all orders for this visit:    1. Well adult health check (Primary)  -     CBC Auto Differential; Future  -     Comprehensive Metabolic Panel; Future  -     Lipid Panel; Future  -     TSH; Future  -     Urinalysis With Culture If Indicated - Urine, Clean Catch; Future  -     ECG 12 Lead  -     Tdap (Adacel) 5-2-15.5 LF-MCG/0.5 injection; Inject 0.5 mL into the appropriate muscle as directed by  prescriber 1 (One) Time for 1 dose.  Dispense: 0.5 mL; Refill: 0    2. Paranoid schizophrenia (CMS/HCC)    3. Pharyngoesophageal dysphagia    4. Screening for malignant neoplasm of prostate  -     PSA Screen; Future    5. Encounter for screening colonoscopy  -     Ambulatory Referral to Gastroenterology    6. Poor dentition  -     ECG 12 Lead    7. 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  He will go for labs today, he is not fasting.  2.  Follows with Dr. Hood for psychiatry.  3.  Following with GI, recent endoscopy that revealed reflux and is following with speech therapy for pharyngeal esophageal dysphagia and is on PPI therapy lifelong.  4.  Will fax referral for colonoscopy which he is due for.  Last one was slightly abnormal in 2017.  5.  He will be having all of his teeth removed for plan of dentures in the near future.  EKG was done in office today and is stable.  As long as labs are stable will clear for dental surgery.  6.  We were out of tetanus vaccines in office today so order was sent to pharmacy for him to have done.  He will additionally have his shingles vaccines done in the next few months as well.  Up-to-date on Covid and I did log is in system today.    Follow Up   Return for Next scheduled follow up.  Patient was given instructions and counseling regarding his condition or for health maintenance advice. Please see specific information pulled into the AVS if appropriate.

## 2021-07-10 LAB
PSA SERPL-MCNC: 0.41 NG/ML (ref 0–4)
TSH SERPL DL<=0.05 MIU/L-ACNC: 1.75 UIU/ML (ref 0.27–4.2)

## 2021-07-12 NOTE — PROGRESS NOTES
Please let pt know results: tsh wnl, psa stable, cmp ok, cholesterol ok, ua clear, cbc ok. Please let patient/his brother know labs look ok and he is cleared for dental surgery. I did not see forms from Dr Wyatt so we may need to contact their office to get this and fax back. Thanks.

## 2021-07-31 DIAGNOSIS — I10 ESSENTIAL HYPERTENSION: ICD-10-CM

## 2021-08-02 NOTE — TELEPHONE ENCOUNTER
Pt last seen 7/9, no f/u time rec. Pt not sent lisinopril at this visit. Will route to provider for further advisement.

## 2021-08-03 RX ORDER — LISINOPRIL 40 MG/1
40 TABLET ORAL DAILY
Qty: 30 TABLET | Refills: 2 | Status: SHIPPED | OUTPATIENT
Start: 2021-08-03 | End: 2021-12-02

## 2021-12-02 DIAGNOSIS — I10 ESSENTIAL HYPERTENSION: ICD-10-CM

## 2021-12-02 RX ORDER — LISINOPRIL 40 MG/1
TABLET ORAL
Qty: 30 TABLET | Refills: 0 | Status: SHIPPED | OUTPATIENT
Start: 2021-12-02 | End: 2022-01-03

## 2021-12-13 ENCOUNTER — TELEPHONE (OUTPATIENT)
Dept: FAMILY MEDICINE CLINIC | Facility: CLINIC | Age: 61
End: 2021-12-13

## 2021-12-13 NOTE — TELEPHONE ENCOUNTER
Caller: LIANG SEGURA    Relationship: Emergency Contact    Best call back number: [phone]    What is the best time to reach you: ANY    Who are you requesting to speak with (clinical staff, provider,  specific staff member): CLINICAL        What was the call regarding: PATIENTS BROTHER STATES THE PATIENT HAS HAD DIARRHEA FOR THE PAST WEEK AND HAS HAD SOME CHILLS. PATIENTS BROTHER WANTS TO KNOW WHAT HE SHOULD DO? PLEASE ADVISE    Do you require a callback: YES

## 2021-12-15 NOTE — TELEPHONE ENCOUNTER
Contacted pt back, verified name and . Pt stated sx have improved since called. Advised if need to/worsen, welcome to be seen. Pt vu of all and agreed.

## 2021-12-18 ENCOUNTER — TELEMEDICINE (OUTPATIENT)
Dept: FAMILY MEDICINE CLINIC | Facility: TELEHEALTH | Age: 61
End: 2021-12-18

## 2021-12-18 DIAGNOSIS — B34.9 VIRAL ILLNESS: Primary | ICD-10-CM

## 2021-12-18 PROCEDURE — 99213 OFFICE O/P EST LOW 20 MIN: CPT | Performed by: NURSE PRACTITIONER

## 2021-12-18 NOTE — PATIENT INSTRUCTIONS
Go to the nearest [facility] for your Covid-19 test. Wear a mask. When you arrive, notify the staff that you have done a virtual visit and have a covid test ordered. You will not need to be seen again by a provider. You will be notified in 1-5 days about the results of your test, by telephone call from a blocked cell number, and via [employer].  If symptoms worsen or do not improve by next week, follow up with PCP.      Viral Illness, Adult  Viruses are tiny germs that can get into a person's body and cause illness. There are many different types of viruses, and they cause many types of illness. Viral illnesses can range from mild to severe. They can affect various parts of the body.  Short-term conditions that are caused by a virus include colds and the flu (influenza). Long-term conditions that are caused by a virus include herpes, shingles, and HIV (human immunodeficiency virus) infection. A few viruses have been linked to certain cancers.  What are the causes?  Many types of viruses can cause illness. Viruses invade cells in your body, multiply, and cause the infected cells to work abnormally or die. When these cells die, they release more of the virus. When this happens, you develop symptoms of the illness, and the virus continues to spread to other cells. If the virus takes over the function of the cell, it can cause the cell to divide and grow out of control. This happens when a virus causes cancer.  Different viruses get into the body in different ways. You can get a virus by:  · Swallowing food or water that has come in contact with the virus (is contaminated).  · Breathing in droplets that have been coughed or sneezed into the air by an infected person.  · Touching a surface that has been contaminated with the virus and then touching your eyes, nose, or mouth.  · Being bitten by an insect or animal that carries the virus.  · Having sexual contact with a person who is infected with the virus.  · Being  exposed to blood or fluids that contain the virus, either through an open cut or during a transfusion.  If a virus enters your body, your body's defense system (immune system) will try to fight the virus. You may be at higher risk for a viral illness if your immune system is weak.  What are the signs or symptoms?  You may have these symptoms, depending on the type of virus and the location of the cells that it invades:  · Cold and flu viruses:  ? Fever.  ? Headache.  ? Sore throat.  ? Muscle aches.  ? Stuffy nose (nasal congestion).  ? Cough.  · Digestive system (gastrointestinal) viruses:  ? Fever.  ? Pain in the abdomen.  ? Nausea.  ? Diarrhea.  · Liver viruses (hepatitis):  ? Loss of appetite.  ? Tiredness.  ? Skin or the white parts of your eyes turning yellow (jaundice).  · Brain and spinal cord viruses:  ? Fever.  ? Headache.  ? Stiff neck.  ? Nausea and vomiting.  ? Confusion or sleepiness.  · Skin viruses:  ? Warts.  ? Itching.  ? Rash.  · Sexually transmitted viruses:  ? Discharge.  ? Swelling.  ? Redness.  ? Rash.  How is this diagnosed?  This condition may be diagnosed based on one or more of the following:  · Symptoms.  · Medical history.  · Physical exam.  · Blood test, sample of mucus from your lungs (sputum sample), stool sample, or a swab of body fluids or a skin sore (lesion).  How is this treated?  Viruses can be hard to treat because they live within cells. Antibiotic medicines do not treat viruses because these medicines do not get inside cells. Treatment for a viral illness may include:  · Resting and drinking plenty of fluids.  · Medicines to relieve symptoms. These can include over-the-counter medicine for pain and fever, medicines for cough or congestion, and medicines to relieve diarrhea.  · Antiviral medicines. These medicines are available only for certain types of viruses.  Some viral illnesses can be prevented with vaccinations. A common example is the flu shot.  Follow these  instructions at home:  Medicines  · Take over-the-counter and prescription medicines only as told by your health care provider.  · If you were prescribed an antiviral medicine, take it as told by your health care provider. Do not stop taking the antiviral even if you start to feel better.  · Be aware of when antibiotics are needed and when they are not needed. Antibiotics do not treat viruses. You may get an antibiotic if your health care provider thinks that you may have, or are at risk for, a bacterial infection and you have a viral infection.  ? Do not ask for an antibiotic prescription if you have been diagnosed with a viral illness. Antibiotics will not make your illness go away faster.  ? Frequently taking antibiotics when they are not needed can lead to antibiotic resistance. When this develops, the medicine no longer works against the bacteria that it normally fights.  General instructions    · Drink enough fluids to keep your urine pale yellow.  · Rest as much as possible.  · Return to your normal activities as told by your health care provider. Ask your health care provider what activities are safe for you.  · Keep all follow-up visits as told by your health care provider. This is important.    How is this prevented?  To reduce your risk of viral illness:  · Wash your hands often with soap and water for at least 20 seconds. If soap and water are not available, use hand .  · Avoid touching your nose, eyes, and mouth, especially if you have not washed your hands recently.  · If anyone in your household has a viral infection, clean all household surfaces that may have been in contact with the virus. Use soap and hot water. You may also use bleach that you have added water to (diluted).  · Stay away from people who are sick with symptoms of a viral infection.  · Do not share items such as toothbrushes and water bottles with other people.  · Keep your vaccinations up to date. This includes getting a  yearly flu shot.  · Eat a healthy diet and get plenty of rest.  Contact a health care provider if:  · You have symptoms of a viral illness that do not go away.  · Your symptoms come back after going away.  · Your symptoms get worse.  Get help right away if you have:  · Trouble breathing.  · A severe headache or a stiff neck.  · Severe vomiting or pain in your abdomen.  These symptoms may represent a serious problem that is an emergency. Do not wait to see if the symptoms will go away. Get medical help right away. Call your local emergency services (911 in the [place] Do not drive yourself to the hospital.  Summary  · Viruses are types of germs that can get into a person's body and cause illness. Viral illnesses can range from mild to severe. They can affect various parts of the body.  · Viruses can be hard to treat. There are medicines to relieve symptoms, and there are some antiviral medicines.  · If you were prescribed an antiviral medicine, take it as told by your health care provider. Do not stop taking the antiviral even if you start to feel better.  · Contact a health care provider if you have symptoms of a viral illness that do not go away.  This information is not intended to replace advice given to you by your health care provider. Make sure you discuss any questions you have with your health care provider.  Document Revised: 05/03/2021 Document Reviewed: 10/27/2020  Elsevier Patient Education © 2021 [employer]

## 2021-12-18 NOTE — PROGRESS NOTES
Subjective   Ryan Osborn is a 61 y.o. male.     He has a headache, and sinus pain, and diarrhea, vomitting, and coughing up phlegm, sore throat. Yesterday he woke up worse with coughing, sore throat, and fatigue. No one else in the house has been ill.       The following portions of the patient's history were reviewed and updated as appropriate: allergies, current medications, past family history, past medical history, past social history, past surgical history, and problem list.    Review of Systems   Constitutional: Positive for appetite change, chills and fever (low grade).   HENT: Positive for congestion.    Respiratory: Positive for cough and wheezing. Negative for shortness of breath.        Objective   Physical Exam  Constitutional:       General: He is not in acute distress.     Appearance: He is well-developed. He is not diaphoretic.   Pulmonary:      Effort: Pulmonary effort is normal.   Neurological:      Mental Status: He is alert and oriented to person, place, and time.   Psychiatric:         Behavior: Behavior normal.           Assessment/Plan   Diagnoses and all orders for this visit:    1. Viral illness (Primary)  -     COVID PRE-OP / PRE-PROCEDURE SCREENING ORDER (NO ISOLATION) - Swab, Nasopharynx; Future                 The use of a video visit has been reviewed with the patient and verbal informed consent has been obtained. Myself and Ryan Osborn and his brother Jeremy participated in this visit. The patient is located in [place], [place]. I am located in [place], [place]. Mychart and Zoom were utilized. I spent 20 minutes in the patient's chart for this visit.

## 2021-12-19 PROCEDURE — 87635 SARS-COV-2 COVID-19 AMP PRB: CPT | Performed by: NURSE PRACTITIONER

## 2021-12-27 DIAGNOSIS — I10 ESSENTIAL HYPERTENSION: ICD-10-CM

## 2021-12-27 RX ORDER — LISINOPRIL 40 MG/1
TABLET ORAL
Qty: 30 TABLET | Refills: 10 | OUTPATIENT
Start: 2021-12-27

## 2021-12-29 ENCOUNTER — OFFICE VISIT (OUTPATIENT)
Dept: FAMILY MEDICINE CLINIC | Facility: CLINIC | Age: 61
End: 2021-12-29

## 2021-12-29 VITALS
HEIGHT: 72 IN | DIASTOLIC BLOOD PRESSURE: 80 MMHG | BODY MASS INDEX: 30.2 KG/M2 | SYSTOLIC BLOOD PRESSURE: 110 MMHG | OXYGEN SATURATION: 98 % | HEART RATE: 70 BPM | WEIGHT: 223 LBS | TEMPERATURE: 98.1 F | RESPIRATION RATE: 20 BRPM

## 2021-12-29 DIAGNOSIS — J01.90 ACUTE SINUSITIS, RECURRENCE NOT SPECIFIED, UNSPECIFIED LOCATION: Primary | ICD-10-CM

## 2021-12-29 DIAGNOSIS — R05.9 COUGH: ICD-10-CM

## 2021-12-29 PROCEDURE — 99213 OFFICE O/P EST LOW 20 MIN: CPT | Performed by: NURSE PRACTITIONER

## 2021-12-29 PROCEDURE — 96372 THER/PROPH/DIAG INJ SC/IM: CPT | Performed by: NURSE PRACTITIONER

## 2021-12-29 RX ORDER — CEFTRIAXONE 1 G/1
1 INJECTION, POWDER, FOR SOLUTION INTRAMUSCULAR; INTRAVENOUS EVERY 24 HOURS
Status: COMPLETED | OUTPATIENT
Start: 2021-12-29 | End: 2021-12-29

## 2021-12-29 RX ORDER — CEFUROXIME AXETIL 500 MG/1
500 TABLET ORAL 2 TIMES DAILY
Qty: 20 TABLET | Refills: 0 | Status: SHIPPED | OUTPATIENT
Start: 2021-12-29 | End: 2022-06-20

## 2021-12-29 RX ORDER — DEXTROMETHORPHAN HYDROBROMIDE AND PROMETHAZINE HYDROCHLORIDE 15; 6.25 MG/5ML; MG/5ML
5 SYRUP ORAL 4 TIMES DAILY PRN
Qty: 180 ML | Refills: 0 | Status: SHIPPED | OUTPATIENT
Start: 2021-12-29 | End: 2022-06-20

## 2021-12-29 RX ORDER — DEXAMETHASONE SODIUM PHOSPHATE 4 MG/ML
8 INJECTION, SOLUTION INTRA-ARTICULAR; INTRALESIONAL; INTRAMUSCULAR; INTRAVENOUS; SOFT TISSUE ONCE
Status: COMPLETED | OUTPATIENT
Start: 2021-12-29 | End: 2021-12-29

## 2021-12-29 RX ADMIN — DEXAMETHASONE SODIUM PHOSPHATE 8 MG: 4 INJECTION, SOLUTION INTRA-ARTICULAR; INTRALESIONAL; INTRAMUSCULAR; INTRAVENOUS; SOFT TISSUE at 12:37

## 2021-12-29 RX ADMIN — CEFTRIAXONE 1 G: 1 INJECTION, POWDER, FOR SOLUTION INTRAMUSCULAR; INTRAVENOUS at 12:37

## 2021-12-29 NOTE — PROGRESS NOTES
"Chief Complaint  Cough and sinus drainage    Subjective    History of Present Illness      Patient presents to [facility] for   Pt c/o coughing up green sputum and sinus drainage x 2 weeks. Pt has a negative COVID test x 1 week ago.    Cough  This is a new problem. The current episode started 1 to 4 weeks ago. The problem has been gradually improving. The cough is productive of sputum.        Review of Systems   Respiratory: Positive for cough.        I have reviewed and agree with the HPI and ROS information as above.  Valerie Kelly, APRN     Objective   Vital Signs:   /80   Pulse 70   Temp 98.1 °F (36.7 °C)   Resp 20   Ht 182.9 cm (72\")   Wt 101 kg (223 lb)   SpO2 98%   BMI 30.24 kg/m²       Physical Exam  Vitals and nursing note reviewed.   Constitutional:       Appearance: Normal appearance.   HENT:      Head: Normocephalic and atraumatic.      Right Ear: Tympanic membrane is erythematous and bulging.      Left Ear: Tympanic membrane is erythematous and bulging.      Nose: Congestion present.      Mouth/Throat:      Pharynx: Posterior oropharyngeal erythema present.   Cardiovascular:      Rate and Rhythm: Normal rate and regular rhythm.      Pulses: Normal pulses.      Heart sounds: Normal heart sounds.   Pulmonary:      Effort: Pulmonary effort is normal.   Musculoskeletal:         General: Normal range of motion.      Cervical back: Normal range of motion and neck supple.   Skin:     General: Skin is warm and dry.   Neurological:      General: No focal deficit present.      Mental Status: He is alert and oriented to person, place, and time.   Psychiatric:         Mood and Affect: Mood normal.         Behavior: Behavior normal.                 Assessment and Plan      Problem List Items Addressed This Visit     None      Visit Diagnoses     Acute sinusitis, recurrence not specified, unspecified location    -  Primary    Relevant Medications    dexamethasone " (DECADRON) injection 8 mg (Start on 12/29/2021  1:30 PM)    cefTRIAXone (ROCEPHIN) injection 1 g (Start on 12/29/2021  1:30 PM)    promethazine-dextromethorphan (PROMETHAZINE-DM) 6.25-15 MG/5ML syrup    cefuroxime (CEFTIN) 500 MG tablet    Cough        Relevant Medications    promethazine-dextromethorphan (PROMETHAZINE-DM) 6.25-15 MG/5ML syrup      Patient has been sick for 2 weeks with cough and congestion.  Had a recent negative Covid test through [facility].  Brother was also retested for Covid yesterday was also negative.  We will treat patient as acute sinus infection.  Plan  1.  Dex 8 mg and Rocephin 1 g injection in office  2.  Oral Ceftin  3.  Cough medicine as needed      Follow Up   Return if symptoms worsen or fail to improve.  Patient was given instructions and counseling regarding his condition or for health maintenance advice. Please see specific information pulled into the AVS if appropriate.

## 2021-12-29 NOTE — PROGRESS NOTES
After obtaining consent, and per orders of Valerie Camargo, APRN injection of Decadron 8mg and Rocephin 1Gm given by Amalia Whitney RN. Patient instructed to remain in clinic for 20 minutes afterwards, and to report any adverse reaction to me immediately. Pt tolerated well with no adverse reactions.

## 2022-01-03 DIAGNOSIS — I10 ESSENTIAL HYPERTENSION: ICD-10-CM

## 2022-01-03 RX ORDER — LISINOPRIL 40 MG/1
TABLET ORAL
Qty: 30 TABLET | Refills: 0 | Status: SHIPPED | OUTPATIENT
Start: 2022-01-03 | End: 2022-03-07

## 2022-01-26 DIAGNOSIS — I10 ESSENTIAL HYPERTENSION: ICD-10-CM

## 2022-01-27 RX ORDER — LISINOPRIL 40 MG/1
TABLET ORAL
Qty: 30 TABLET | Refills: 10 | OUTPATIENT
Start: 2022-01-27

## 2022-03-01 DIAGNOSIS — I10 ESSENTIAL HYPERTENSION: ICD-10-CM

## 2022-03-01 RX ORDER — LISINOPRIL 40 MG/1
TABLET ORAL
Qty: 30 TABLET | Refills: 10 | OUTPATIENT
Start: 2022-03-01

## 2022-03-07 DIAGNOSIS — I10 ESSENTIAL HYPERTENSION: ICD-10-CM

## 2022-03-07 RX ORDER — LISINOPRIL 40 MG/1
TABLET ORAL
Qty: 30 TABLET | Refills: 0 | Status: SHIPPED | OUTPATIENT
Start: 2022-03-07 | End: 2022-03-29

## 2022-03-29 DIAGNOSIS — I10 ESSENTIAL HYPERTENSION: ICD-10-CM

## 2022-03-29 RX ORDER — LISINOPRIL 40 MG/1
TABLET ORAL
Qty: 30 TABLET | Refills: 0 | Status: SHIPPED | OUTPATIENT
Start: 2022-03-29 | End: 2022-06-18

## 2022-04-30 DIAGNOSIS — I10 ESSENTIAL HYPERTENSION: ICD-10-CM

## 2022-04-30 RX ORDER — LISINOPRIL 40 MG/1
TABLET ORAL
Qty: 30 TABLET | Refills: 0 | OUTPATIENT
Start: 2022-04-30

## 2022-05-06 DIAGNOSIS — I10 ESSENTIAL HYPERTENSION: ICD-10-CM

## 2022-05-06 RX ORDER — LISINOPRIL 40 MG/1
TABLET ORAL
Qty: 30 TABLET | Refills: 0 | OUTPATIENT
Start: 2022-05-06

## 2022-05-31 DIAGNOSIS — I10 ESSENTIAL HYPERTENSION: ICD-10-CM

## 2022-05-31 RX ORDER — LISINOPRIL 40 MG/1
TABLET ORAL
Qty: 30 TABLET | Refills: 0 | OUTPATIENT
Start: 2022-05-31

## 2022-06-06 DIAGNOSIS — I10 ESSENTIAL HYPERTENSION: ICD-10-CM

## 2022-06-06 RX ORDER — LISINOPRIL 40 MG/1
TABLET ORAL
Qty: 30 TABLET | Refills: 0 | OUTPATIENT
Start: 2022-06-06

## 2022-06-18 DIAGNOSIS — I10 ESSENTIAL HYPERTENSION: Primary | ICD-10-CM

## 2022-06-18 RX ORDER — LISINOPRIL 40 MG/1
40 TABLET ORAL DAILY
Qty: 5 TABLET | Refills: 0 | Status: SHIPPED | OUTPATIENT
Start: 2022-06-18 | End: 2022-06-20 | Stop reason: SDUPTHER

## 2022-06-20 ENCOUNTER — OFFICE VISIT (OUTPATIENT)
Dept: FAMILY MEDICINE CLINIC | Facility: CLINIC | Age: 62
End: 2022-06-20

## 2022-06-20 VITALS
SYSTOLIC BLOOD PRESSURE: 136 MMHG | OXYGEN SATURATION: 96 % | WEIGHT: 226.2 LBS | HEART RATE: 69 BPM | TEMPERATURE: 97 F | BODY MASS INDEX: 30.64 KG/M2 | HEIGHT: 72 IN | RESPIRATION RATE: 16 BRPM | DIASTOLIC BLOOD PRESSURE: 76 MMHG

## 2022-06-20 DIAGNOSIS — D22.9 CHANGE IN SKIN MOLE: ICD-10-CM

## 2022-06-20 DIAGNOSIS — E66.9 CLASS 1 OBESITY WITH BODY MASS INDEX (BMI) OF 30.0 TO 30.9 IN ADULT, UNSPECIFIED OBESITY TYPE, UNSPECIFIED WHETHER SERIOUS COMORBIDITY PRESENT: ICD-10-CM

## 2022-06-20 DIAGNOSIS — I10 ESSENTIAL HYPERTENSION: Primary | ICD-10-CM

## 2022-06-20 DIAGNOSIS — I10 ESSENTIAL HYPERTENSION: ICD-10-CM

## 2022-06-20 PROBLEM — E66.811 CLASS 1 OBESITY WITH BODY MASS INDEX (BMI) OF 30.0 TO 30.9 IN ADULT: Status: ACTIVE | Noted: 2022-06-20

## 2022-06-20 PROCEDURE — 99213 OFFICE O/P EST LOW 20 MIN: CPT | Performed by: NURSE PRACTITIONER

## 2022-06-20 RX ORDER — LISINOPRIL 40 MG/1
TABLET ORAL
Qty: 30 TABLET | Refills: 0 | OUTPATIENT
Start: 2022-06-20

## 2022-06-20 RX ORDER — LISINOPRIL 40 MG/1
40 TABLET ORAL DAILY
Qty: 30 TABLET | Refills: 2 | Status: SHIPPED | OUTPATIENT
Start: 2022-06-20 | End: 2022-09-19 | Stop reason: SDUPTHER

## 2022-06-20 NOTE — PROGRESS NOTES
"Chief Complaint  Hypertension (Med check and refills)    Subjective    History of Present Illness      Patient presents to [facility] for   Hypertension Med check and refills           Review of Systems   Constitutional: Negative.    HENT: Negative.    Eyes: Negative.    Respiratory: Negative.    Cardiovascular: Negative.    Gastrointestinal: Negative.    Endocrine: Negative.    Genitourinary: Negative.    Musculoskeletal: Negative.    Skin: Negative.    Allergic/Immunologic: Negative.    Neurological: Negative.    Hematological: Negative.    Psychiatric/Behavioral: Negative.        I have reviewed and agree with the HPI and ROS information as above.  ABBY Elise     Objective   Vital Signs:   /76   Pulse 69   Temp 97 °F (36.1 °C)   Resp 16   Ht 182.9 cm (72\")   Wt 103 kg (226 lb 3.2 oz)   SpO2 96%   BMI 30.68 kg/m²           Physical Exam  Constitutional:       Appearance: Normal appearance. He is well-developed. He is obese.   HENT:      Head: Normocephalic and atraumatic.      Right Ear: External ear normal.      Left Ear: External ear normal.      Nose: Nose normal. No nasal tenderness or congestion.      Mouth/Throat:      Lips: Pink. No lesions.      Mouth: Mucous membranes are moist. No oral lesions.      Dentition: Normal dentition.      Pharynx: Oropharynx is clear. No pharyngeal swelling, oropharyngeal exudate or posterior oropharyngeal erythema.   Eyes:      General: Lids are normal. Vision grossly intact. No scleral icterus.        Right eye: No discharge.         Left eye: No discharge.      Extraocular Movements: Extraocular movements intact.      Conjunctiva/sclera: Conjunctivae normal.      Right eye: Right conjunctiva is not injected.      Left eye: Left conjunctiva is not injected.      Pupils: Pupils are equal, round, and reactive to light.   Cardiovascular:      Rate and Rhythm: Normal rate and regular rhythm.      Heart sounds: Normal heart " sounds. No murmur heard.    No gallop.   Pulmonary:      Effort: Pulmonary effort is normal.      Breath sounds: Normal breath sounds and air entry. No wheezing, rhonchi or rales.   Musculoskeletal:         General: No tenderness or deformity. Normal range of motion.      Cervical back: Full passive range of motion without pain, normal range of motion and neck supple.      Right lower leg: No edema.      Left lower leg: No edema.   Skin:     General: Skin is warm and dry.      Coloration: Skin is not jaundiced.      Findings: No rash.   Neurological:      Mental Status: He is alert and oriented to person, place, and time.      Cranial Nerves: Cranial nerves are intact.      Sensory: Sensation is intact.      Motor: Motor function is intact.      Coordination: Coordination is intact.      Gait: Gait is intact.   Psychiatric:         Attention and Perception: Attention normal.         Mood and Affect: Mood and affect normal.         Behavior: Behavior is not hyperactive. Behavior is cooperative.         Thought Content: Thought content normal.         Judgment: Judgment normal.          MARKUS-7:      PHQ-2 Depression Screening  Little interest or pleasure in doing things? 1-->several days   Feeling down, depressed, or hopeless? 1-->several days   PHQ-2 Total Score 8     PHQ-9 Depression Screening  Little interest or pleasure in doing things? 1-->several days   Feeling down, depressed, or hopeless? 1-->several days   Trouble falling or staying asleep, or sleeping too much? 1-->several days   Feeling tired or having little energy? 1-->several days   Poor appetite or overeating? 1-->several days   Feeling bad about yourself - or that you are a failure or have let yourself or your family down? 1-->several days   Trouble concentrating on things, such as reading the newspaper or watching television? 1-->several days   Moving or speaking so slowly that other people could have noticed? Or the opposite - being so fidgety or  restless that you have been moving around a lot more than usual? 1-->several days   Thoughts that you would be better off dead, or of hurting yourself in some way? 0-->not at all   PHQ-9 Total Score 8   If you checked off any problems, how difficult have these problems made it for you to do your work, take care of things at home, or get along with other people? somewhat difficult      Result Review  Data Reviewed:   The following data was reviewed by: ABBY Elise on 06/20/2022:  PSA Screen (07/09/2021 15:38)  Urinalysis With Culture If Indicated - Urine, Clean Catch (07/09/2021 15:38)  TSH (07/09/2021 15:38)  Lipid Panel (07/09/2021 15:38)  Comprehensive Metabolic Panel (07/09/2021 15:38)  CBC Auto Differential (07/09/2021 15:38)             Assessment and Plan      Problem List Items Addressed This Visit        Cardiac and Vasculature    Essential hypertension - Primary    Relevant Medications    lisinopril (PRINIVIL,ZESTRIL) 40 MG tablet       Endocrine and Metabolic    Class 1 obesity with body mass index (BMI) of 30.0 to 30.9 in adult      Other Visit Diagnoses     Change in skin mole        Relevant Orders    Ambulatory Referral to Dermatology        Pt here today for a hypertension follow up. He states he is doing well overall. BP stable in office today. States he does not check his blood pressure at home. Last labs were in July 2021. He is also requesting a referral to dermatology for changes in skin moles on his back. Depression screening is positive, but mental health meds are managed per psych.     Plan:    1. Continue lisinopril 40mg daily, refills sent.   2. Offered labs today. Pt would like to wait to have labs completed with is wellness exam which is due after July 10th.  3. Referral placed to dermatology.  4. F/u 3 months.     Follow Up   Return in about 3 months (around 9/20/2022) for Recheck.  Patient was given instructions and counseling regarding his condition or for health maintenance  advice. Please see specific information pulled into the AVS if appropriate.

## 2022-09-19 ENCOUNTER — LAB (OUTPATIENT)
Dept: LAB | Facility: HOSPITAL | Age: 62
End: 2022-09-19

## 2022-09-19 ENCOUNTER — OFFICE VISIT (OUTPATIENT)
Dept: FAMILY MEDICINE CLINIC | Facility: CLINIC | Age: 62
End: 2022-09-19

## 2022-09-19 VITALS
HEIGHT: 72 IN | RESPIRATION RATE: 18 BRPM | SYSTOLIC BLOOD PRESSURE: 135 MMHG | WEIGHT: 227.4 LBS | TEMPERATURE: 98.9 F | OXYGEN SATURATION: 96 % | DIASTOLIC BLOOD PRESSURE: 78 MMHG | HEART RATE: 68 BPM | BODY MASS INDEX: 30.8 KG/M2

## 2022-09-19 DIAGNOSIS — Z00.00 ENCOUNTER FOR ANNUAL PHYSICAL EXAM: Primary | ICD-10-CM

## 2022-09-19 DIAGNOSIS — Z00.00 ENCOUNTER FOR ANNUAL PHYSICAL EXAM: ICD-10-CM

## 2022-09-19 DIAGNOSIS — I10 ESSENTIAL HYPERTENSION: ICD-10-CM

## 2022-09-19 DIAGNOSIS — E66.9 CLASS 1 OBESITY WITH BODY MASS INDEX (BMI) OF 30.0 TO 30.9 IN ADULT, UNSPECIFIED OBESITY TYPE, UNSPECIFIED WHETHER SERIOUS COMORBIDITY PRESENT: ICD-10-CM

## 2022-09-19 DIAGNOSIS — Z12.5 SPECIAL SCREENING, PROSTATE CANCER: ICD-10-CM

## 2022-09-19 DIAGNOSIS — Z12.11 SCREENING FOR COLON CANCER: ICD-10-CM

## 2022-09-19 DIAGNOSIS — F41.9 ANXIETY: ICD-10-CM

## 2022-09-19 LAB
ALBUMIN SERPL-MCNC: 4.3 G/DL (ref 3.5–5)
ALBUMIN/GLOB SERPL: 1.2 G/DL (ref 1.1–2.5)
ALP SERPL-CCNC: 94 U/L (ref 24–120)
ALT SERPL W P-5'-P-CCNC: 28 U/L (ref 0–50)
ANION GAP SERPL CALCULATED.3IONS-SCNC: 2 MMOL/L (ref 4–13)
AST SERPL-CCNC: 29 U/L (ref 7–45)
AUTO MIXED CELLS #: 1.2 10*3/MM3 (ref 0.1–2.6)
AUTO MIXED CELLS %: 17 % (ref 0.1–24)
BILIRUB SERPL-MCNC: 0.4 MG/DL (ref 0.1–1)
BILIRUB UR QL STRIP: NEGATIVE
BUN SERPL-MCNC: 17 MG/DL (ref 5–21)
BUN/CREAT SERPL: 14.4
CALCIUM SPEC-SCNC: 9 MG/DL (ref 8.4–10.4)
CHLORIDE SERPL-SCNC: 98 MMOL/L (ref 98–110)
CHOLEST SERPL-MCNC: 149 MG/DL (ref 130–200)
CLARITY UR: CLEAR
CO2 SERPL-SCNC: 33 MMOL/L (ref 24–31)
COLOR UR: YELLOW
CREAT SERPL-MCNC: 1.18 MG/DL (ref 0.5–1.4)
EGFRCR SERPLBLD CKD-EPI 2021: 69.8 ML/MIN/1.73
ERYTHROCYTE [DISTWIDTH] IN BLOOD BY AUTOMATED COUNT: 12.4 % (ref 12.3–15.4)
GLOBULIN UR ELPH-MCNC: 3.6 GM/DL
GLUCOSE SERPL-MCNC: 116 MG/DL (ref 70–100)
GLUCOSE UR STRIP-MCNC: NEGATIVE MG/DL
HBA1C MFR BLD: 5.9 % (ref 4.8–5.9)
HCT VFR BLD AUTO: 34.8 % (ref 37.5–51)
HDLC SERPL-MCNC: 36 MG/DL
HGB BLD-MCNC: 12.5 G/DL (ref 13–17.7)
HGB UR QL STRIP.AUTO: NEGATIVE
KETONES UR QL STRIP: NEGATIVE
LDLC SERPL CALC-MCNC: 69 MG/DL (ref 0–99)
LDLC/HDLC SERPL: 1.62 {RATIO}
LEUKOCYTE ESTERASE UR QL STRIP.AUTO: NEGATIVE
LYMPHOCYTES # BLD AUTO: 3.7 10*3/MM3 (ref 0.7–3.1)
LYMPHOCYTES NFR BLD AUTO: 52.2 % (ref 19.6–45.3)
MCH RBC QN AUTO: 32.2 PG (ref 26.6–33)
MCHC RBC AUTO-ENTMCNC: 35.9 G/DL (ref 31.5–35.7)
MCV RBC AUTO: 89.7 FL (ref 79–97)
NEUTROPHILS NFR BLD AUTO: 2.1 10*3/MM3 (ref 1.7–7)
NEUTROPHILS NFR BLD AUTO: 30.8 % (ref 42.7–76)
NITRITE UR QL STRIP: NEGATIVE
PH UR STRIP.AUTO: 5 [PH] (ref 5–8)
PLATELET # BLD AUTO: 297 10*3/MM3 (ref 140–450)
PMV BLD AUTO: 7.6 FL (ref 6–12)
POTASSIUM SERPL-SCNC: 5.1 MMOL/L (ref 3.5–5.3)
PROT SERPL-MCNC: 7.9 G/DL (ref 6.3–8.7)
PROT UR QL STRIP: NEGATIVE
PSA SERPL-MCNC: 0.49 NG/ML (ref 0–4)
RBC # BLD AUTO: 3.88 10*6/MM3 (ref 4.14–5.8)
SODIUM SERPL-SCNC: 133 MMOL/L (ref 135–145)
SP GR UR STRIP: 1.01 (ref 1–1.03)
TRIGL SERPL-MCNC: 273 MG/DL (ref 0–149)
TSH SERPL DL<=0.05 MIU/L-ACNC: 1.96 UIU/ML (ref 0.27–4.2)
UROBILINOGEN UR QL STRIP: NORMAL
VLDLC SERPL-MCNC: 44 MG/DL (ref 5–40)
WBC NRBC COR # BLD: 7 10*3/MM3 (ref 3.4–10.8)

## 2022-09-19 PROCEDURE — G0103 PSA SCREENING: HCPCS

## 2022-09-19 PROCEDURE — 83036 HEMOGLOBIN GLYCOSYLATED A1C: CPT

## 2022-09-19 PROCEDURE — 80050 GENERAL HEALTH PANEL: CPT

## 2022-09-19 PROCEDURE — 99396 PREV VISIT EST AGE 40-64: CPT

## 2022-09-19 PROCEDURE — 36415 COLL VENOUS BLD VENIPUNCTURE: CPT

## 2022-09-19 PROCEDURE — 80061 LIPID PANEL: CPT

## 2022-09-19 PROCEDURE — 81003 URINALYSIS AUTO W/O SCOPE: CPT

## 2022-09-19 RX ORDER — LISINOPRIL 40 MG/1
40 TABLET ORAL DAILY
Qty: 30 TABLET | Refills: 2 | Status: SHIPPED | OUTPATIENT
Start: 2022-09-19 | End: 2023-02-04 | Stop reason: SDUPTHER

## 2022-09-19 NOTE — PROGRESS NOTES
"Chief Complaint  Hypertension (Patient states that he is here for a 3 month FU and med refill ), Anxiety, Depression, Manic Behavior (Med check and refill ), and Annual Exam (Patient states that he is here for annual wellness )    Subjective        Ryan Osborn presents to [facility]  History of Present Illness  Hypertension Patient states that he is here for a 3 month FU and med refill      Anxiety      Depression      Manic Behavior Med check and refill      Annual Exam Patient states that he is here for annual wellness        Hypertension  Associated symptoms include anxiety.   Anxiety      His past medical history is significant for depression.   Depression      Objective   Vital Signs:  /78 (BP Location: Right arm, Patient Position: Sitting, Cuff Size: Adult)   Pulse 68   Temp 98.9 °F (37.2 °C) (Temporal)   Resp 18   Ht 182.9 cm (72\")   Wt 103 kg (227 lb 6.4 oz)   SpO2 96%   BMI 30.84 kg/m²   Estimated body mass index is 30.84 kg/m² as calculated from the following:    Height as of this encounter: 182.9 cm (72\").    Weight as of this encounter: 103 kg (227 lb 6.4 oz).    BMI is >= 30 and <35. (Class 1 Obesity). The following options were offered after discussion;: exercise counseling/recommendations, nutrition counseling/recommendations and pharmacological intervention options      Physical Exam  Constitutional:       Appearance: Normal appearance. He is well-developed. He is obese.   HENT:      Head: Normocephalic and atraumatic.      Right Ear: Tympanic membrane, ear canal and external ear normal.      Left Ear: Tympanic membrane, ear canal and external ear normal.      Nose: Nose normal. No septal deviation, nasal tenderness or congestion.      Mouth/Throat:      Lips: Pink. No lesions.      Mouth: Mucous membranes are moist. No oral lesions.      Dentition: Normal dentition.      Pharynx: Oropharynx is clear. No pharyngeal swelling, oropharyngeal exudate or " posterior oropharyngeal erythema.   Eyes:      General: Lids are normal. Vision grossly intact. No scleral icterus.        Right eye: No discharge.         Left eye: No discharge.      Extraocular Movements: Extraocular movements intact.      Conjunctiva/sclera: Conjunctivae normal.      Right eye: Right conjunctiva is not injected.      Left eye: Left conjunctiva is not injected.      Pupils: Pupils are equal, round, and reactive to light.   Neck:      Thyroid: No thyroid mass.      Trachea: Trachea normal.   Cardiovascular:      Rate and Rhythm: Normal rate and regular rhythm.      Heart sounds: Normal heart sounds. No murmur heard.    No gallop.   Pulmonary:      Effort: Pulmonary effort is normal.      Breath sounds: Normal breath sounds and air entry. No wheezing, rhonchi or rales.   Abdominal:      General: There is no distension.      Palpations: Abdomen is soft. There is no mass.      Tenderness: There is no abdominal tenderness. There is no right CVA tenderness, left CVA tenderness, guarding or rebound.   Musculoskeletal:         General: No tenderness or deformity. Normal range of motion.      Cervical back: Full passive range of motion without pain, normal range of motion and neck supple.      Thoracic back: Normal.      Right lower leg: No edema.      Left lower leg: No edema.   Skin:     General: Skin is warm and dry.      Coloration: Skin is not jaundiced.      Findings: No rash.   Neurological:      Mental Status: He is alert and oriented to person, place, and time.      Cranial Nerves: Cranial nerves are intact.      Sensory: Sensation is intact.      Motor: Motor function is intact.      Coordination: Coordination is intact.      Gait: Gait is intact.      Deep Tendon Reflexes: Reflexes are normal and symmetric.   Psychiatric:         Mood and Affect: Mood and affect normal.         Judgment: Judgment normal.        Result Review :                Assessment and Plan   Diagnoses and all orders for  this visit:    1. Encounter for annual physical exam (Primary)  -     lisinopril (PRINIVIL,ZESTRIL) 40 MG tablet; Take 1 tablet by mouth Daily.  Dispense: 30 tablet; Refill: 2  -     CBC Auto Differential; Future  -     Comprehensive Metabolic Panel; Future  -     Hemoglobin A1c; Future  -     Lipid Panel; Future  -     TSH; Future  -     Urinalysis With Culture If Indicated -; Future  -     Ambulatory Referral to Gastroenterology    2. Class 1 obesity with body mass index (BMI) of 30.0 to 30.9 in adult, unspecified obesity type, unspecified whether serious comorbidity present    3. Essential hypertension  -     lisinopril (PRINIVIL,ZESTRIL) 40 MG tablet; Take 1 tablet by mouth Daily.  Dispense: 30 tablet; Refill: 2    4. Special screening, prostate cancer  -     PSA Screen; Future    5. Screening for colon cancer  -     Ambulatory Referral to Gastroenterology    Patient is seen today for annual physical exam and HTN refill. Patient BP is stable and doing well. Patient is due for colonoscopy and needing referral to GI. We will obtain labs today. Patient is followed by Dr. Hood for his mental health.     Plan  1. Routine lab work ordered today  2. Referall to GI placed  3. Refill Lisinopril 40mg       The following counseling and eduction was discussed with the patient and will print with AVS.    Anticipatory Guidance/Psychosocial Screening - to include:  • Second hand smoke  • Tobacco Use counseling  • Substance abuse counseling  • Exercise   • At least 800-1,000 units of vitamin D daily and consideration of screening in persons with low sun exposure or other risk   factors  • 1,200 mg. of calcium daily in adults 50 years and older.   • Folic acid (0.4mg to 0.8 mg/day for females of reproductive age (USPSTF - A Recommendation) The USPSTF   recommends that all women who are planning or capable of pregnancy take a daily supplement containing 0.4 to 0.8 mg   (400 to 800 µg) of folic acid.     • Aspirin use -  for the  primary prevention of cardiovascular   disease (CVD) and colorectal cancer (CRC) in adults aged 50 to 59 years who have a 10% or greater 10-year CVD risk, are   not at increased risk for bleeding, have a life expectancy of at least 10 years, and are willing to take low-dose aspirin daily   for at least 10 years.   • Discussion of risks and benefits of hormone replacement prophylaxis and alternative therapies in women  • Polypharmacy  • Safe sex/STD High-intensity behavioral counseling to prevent sexually transmitted infections for all adults at increased   risk for STIs. “High- intensity” behavior counseling is defined by USPSTF as multiple sessions of behavioral counseling   providing some provision of education, skill training or support from changes in sexual behavior that promotes risk   reduction and avoidance. USPSTF - B Recommendation  • Limit exposure of UV light  • Oral health  .•Counseling for Diet  Safety Issues - to include:  Anticipatory Guidance/Safety Issues References  • Domestic Violence: The [place]. Preventive Services Task Force (USPSTF) recommends that clinicians screen women of   childbearing age for intimate partner violence (IPV), such as domestic violence, and provide or refer women who screen   positive to intervention services. This recommendation applies to women who do not have signs or symptoms of abuse.   • Woman Abuse Screening Tool (WAST)   • Personalized Safety Plan   • Smoke and carbon monoxide detectors  • Firearms use and safe storage of  • Appropriate protective/safety equipment for such activities as biking, skating and skiing  • Seat belt use    Patient was given instructions and counseling regarding his/her condition or for health maintenance advice. Please see specific information pulled into the AVS if appropriate.              Follow Up   Return in about 3 months (around 12/19/2022) for Hypertension.  Patient was given instructions and counseling regarding his condition or for  health maintenance advice. Please see specific information pulled into the AVS if appropriate.

## 2022-09-21 ENCOUNTER — TELEPHONE (OUTPATIENT)
Dept: FAMILY MEDICINE CLINIC | Facility: CLINIC | Age: 62
End: 2022-09-21

## 2022-09-21 ENCOUNTER — TELEPHONE (OUTPATIENT)
Dept: GASTROENTEROLOGY | Facility: CLINIC | Age: 62
End: 2022-09-21

## 2022-09-21 NOTE — TELEPHONE ENCOUNTER
----- Message from ABBY Abdul sent at 9/21/2022  7:19 AM CDT -----  Please let patient know that labs show  PSA stable  TSH stable  CMP: glucose elevated, sodium slightly decreased. Encourage to cut back on sugars and increase sodium in diet   UA stable  A1C below goal of 6   Lipid Panel: trigs increased. Will benefit from OTC fish oil daily  CBC: shows mild anemia. Please encourage patient to sustain good nutritional value increasing diet with foods high in iron

## 2022-09-22 ENCOUNTER — TELEPHONE (OUTPATIENT)
Dept: FAMILY MEDICINE CLINIC | Facility: CLINIC | Age: 62
End: 2022-09-22

## 2022-09-22 NOTE — TELEPHONE ENCOUNTER
Sebastián Monte MD    Patient records from provider reviewed  According to medical record Ryan Osborn does not have a history of heart disease or lung disease.  According to medical record Ryan Osborn is not currently on blood thinner.  According to medical record Ryan Osborn is not currently exhibiting abdominal pain, weight loss, hematochezia, melena,  change in bowels, or other symptoms to indicate pt would need to be seen in office.    Will submit order for Ryan Osborn to proceed with [facility]    Medication will be verified as well as a lack of GI related symptomology when pt is scheduled for procedure.      Previous colonoscopy?

## 2022-09-22 NOTE — TELEPHONE ENCOUNTER
Called patient with results of PSA stable  TSH stable  CMP: glucose elevated, sodium slightly decreased. Encourage to cut back on sugars and increase sodium in diet   UA stable  A1C below goal of 6   Lipid Panel: trigs increased. Will benefit from OTC fish oil daily  CBC: shows mild anemia. Please encourage patient to sustain good nutritional value increasing diet with foods high in iron. VU. Diet changes discussed.       [[id number]]

## 2022-09-23 NOTE — TELEPHONE ENCOUNTER
Spoke with patient this morning. Patient seems to be confused about this appointment on 10/20/2022 - I tried my best to explain - Both agreed to keep appointment with Osvaldo and come in the office to schedule procedure with Dr. Stone.     Thank you

## 2023-02-04 DIAGNOSIS — I10 ESSENTIAL HYPERTENSION: ICD-10-CM

## 2023-02-04 DIAGNOSIS — Z00.00 ENCOUNTER FOR ANNUAL PHYSICAL EXAM: ICD-10-CM

## 2023-02-04 RX ORDER — LISINOPRIL 40 MG/1
40 TABLET ORAL DAILY
Qty: 30 TABLET | Refills: 2 | Status: SHIPPED | OUTPATIENT
Start: 2023-02-04

## 2023-04-29 DIAGNOSIS — I10 ESSENTIAL HYPERTENSION: ICD-10-CM

## 2023-05-01 RX ORDER — LISINOPRIL 40 MG/1
TABLET ORAL
Qty: 30 TABLET | Refills: 10 | OUTPATIENT
Start: 2023-05-01

## 2023-05-03 DIAGNOSIS — I10 ESSENTIAL HYPERTENSION: ICD-10-CM

## 2023-05-03 RX ORDER — LISINOPRIL 40 MG/1
TABLET ORAL
Qty: 30 TABLET | Refills: 0 | OUTPATIENT
Start: 2023-05-03

## 2023-05-05 ENCOUNTER — OFFICE VISIT (OUTPATIENT)
Dept: FAMILY MEDICINE CLINIC | Facility: CLINIC | Age: 63
End: 2023-05-05
Payer: COMMERCIAL

## 2023-05-05 VITALS
SYSTOLIC BLOOD PRESSURE: 131 MMHG | HEART RATE: 79 BPM | WEIGHT: 223 LBS | DIASTOLIC BLOOD PRESSURE: 80 MMHG | BODY MASS INDEX: 30.2 KG/M2 | HEIGHT: 72 IN | OXYGEN SATURATION: 96 %

## 2023-05-05 DIAGNOSIS — F43.10 PTSD (POST-TRAUMATIC STRESS DISORDER): ICD-10-CM

## 2023-05-05 DIAGNOSIS — I10 ESSENTIAL HYPERTENSION: ICD-10-CM

## 2023-05-05 DIAGNOSIS — R35.0 URINE FREQUENCY: ICD-10-CM

## 2023-05-05 DIAGNOSIS — Z12.11 COLON CANCER SCREENING: Primary | ICD-10-CM

## 2023-05-05 DIAGNOSIS — F31.9 BIPOLAR AFFECTIVE DISORDER, REMISSION STATUS UNSPECIFIED: ICD-10-CM

## 2023-05-05 DIAGNOSIS — F20.0 PARANOID SCHIZOPHRENIA: ICD-10-CM

## 2023-05-05 PROBLEM — E87.1 HYPONATREMIA: Status: ACTIVE | Noted: 2023-05-05

## 2023-05-05 RX ORDER — LISINOPRIL 40 MG/1
40 TABLET ORAL DAILY
Qty: 30 TABLET | Refills: 5 | Status: SHIPPED | OUTPATIENT
Start: 2023-05-05

## 2023-05-05 RX ORDER — TAMSULOSIN HYDROCHLORIDE 0.4 MG/1
1 CAPSULE ORAL DAILY
Qty: 30 CAPSULE | Refills: 5 | Status: SHIPPED | OUTPATIENT
Start: 2023-05-05

## 2023-05-05 NOTE — PROGRESS NOTES
"Chief Complaint  Hypertension and Urinary Frequency    Subjective    History of Present Illness      Patient presents to [facility] for   History of Present Illness  Pt is here today for HTN med check and refills. Pt also complains of urinary frequency, stating that at times he will urinae and then feel the need to go again shortly after. Pt is also making frequent trips to the bathroom throughotu the night.        Review of Systems    I have reviewed and agree with the HPI information as above.  Valerie Kelly, APRN     Objective   Vital Signs:   /80   Pulse 79   Ht 182.9 cm (72\")   Wt 101 kg (223 lb)   SpO2 96%   BMI 30.24 kg/m²     BMI is >= 30 and <35. (Class 1 Obesity). The following options were offered after discussion;: weight loss educational material (shared in after visit summary), exercise counseling/recommendations and nutrition counseling/recommendations      Physical Exam  Vitals and nursing note reviewed.   Constitutional:       Appearance: Normal appearance. He is well-developed.   HENT:      Head: Normocephalic and atraumatic.      Right Ear: Tympanic membrane, ear canal and external ear normal.      Left Ear: Tympanic membrane, ear canal and external ear normal.      Nose: Nose normal. No septal deviation, nasal tenderness or congestion.      Mouth/Throat:      Lips: Pink. No lesions.      Mouth: Mucous membranes are moist. No oral lesions.      Dentition: Normal dentition.      Pharynx: Oropharynx is clear. No pharyngeal swelling, oropharyngeal exudate or posterior oropharyngeal erythema.   Eyes:      General: Lids are normal. Vision grossly intact. No scleral icterus.        Right eye: No discharge.         Left eye: No discharge.      Extraocular Movements: Extraocular movements intact.      Conjunctiva/sclera: Conjunctivae normal.      Right eye: Right conjunctiva is not injected.      Left eye: Left conjunctiva is not injected.      Pupils: " Pupils are equal, round, and reactive to light.   Neck:      Thyroid: No thyroid mass.      Trachea: Trachea normal.   Cardiovascular:      Rate and Rhythm: Normal rate and regular rhythm.      Heart sounds: Normal heart sounds. No murmur heard.    No gallop.   Pulmonary:      Effort: Pulmonary effort is normal.      Breath sounds: Normal breath sounds and air entry. No wheezing, rhonchi or rales.   Musculoskeletal:         General: No tenderness or deformity. Normal range of motion.      Cervical back: Full passive range of motion without pain, normal range of motion and neck supple.      Thoracic back: Normal.      Right lower leg: No edema.      Left lower leg: No edema.   Skin:     General: Skin is warm and dry.      Coloration: Skin is not jaundiced.      Findings: No rash.   Neurological:      Mental Status: He is alert and oriented to person, place, and time.      Sensory: Sensation is intact.      Motor: Motor function is intact.      Coordination: Coordination is intact.      Gait: Gait is intact.      Deep Tendon Reflexes: Reflexes are normal and symmetric.   Psychiatric:         Mood and Affect: Mood and affect normal.         Behavior: Behavior normal.         Judgment: Judgment normal.          MARKUS-7:      PHQ-2 Depression Screening  Little interest or pleasure in doing things? 0-->not at all   Feeling down, depressed, or hopeless? 2-->more than half the days   PHQ-2 Total Score 6     PHQ-9 Depression Screening  Little interest or pleasure in doing things? 0-->not at all   Feeling down, depressed, or hopeless? 2-->more than half the days   Trouble falling or staying asleep, or sleeping too much? 0-->not at all   Feeling tired or having little energy? 0-->not at all   Poor appetite or overeating? 0-->not at all   Feeling bad about yourself - or that you are a failure or have let yourself or your family down? 0-->not at all   Trouble concentrating on things, such as reading the newspaper or watching  television? 1-->several days   Moving or speaking so slowly that other people could have noticed? Or the opposite - being so fidgety or restless that you have been moving around a lot more than usual? 3-->nearly every day   Thoughts that you would be better off dead, or of hurting yourself in some way? 0-->not at all   PHQ-9 Total Score 6   If you checked off any problems, how difficult have these problems made it for you to do your work, take care of things at home, or get along with other people? somewhat difficult      Result Review  Data Reviewed:                   Assessment and Plan      Diagnoses and all orders for this visit:    1. Colon cancer screening (Primary)  -     Ambulatory Referral For Screening Colonoscopy    2. Essential hypertension  -     lisinopril (PRINIVIL,ZESTRIL) 40 MG tablet; Take 1 tablet by mouth Daily.  Dispense: 30 tablet; Refill: 5    3. Urine frequency  -     tamsulosin (FLOMAX) 0.4 MG capsule 24 hr capsule; Take 1 capsule by mouth Daily.  Dispense: 30 capsule; Refill: 5    4. Bipolar affective disorder, remission status unspecified    5. Paranoid schizophrenia    6. PTSD (post-traumatic stress disorder)    Is here with whom I believe is his brother.  He states that he is doing well on his blood pressure medication and it is well controlled.  He states that he is having urinary frequency specifically at nighttime.  PSA was completed at the last 6 months and it was normal.  We will start Flomax at this time.  If this is not approved consider urology referral.        Follow Up   Return in about 6 months (around 11/5/2023) for Annual physical, Recheck.  Patient was given instructions and counseling regarding his condition or for health maintenance advice. Please see specific information pulled into the AVS if appropriate.

## 2023-06-08 ENCOUNTER — OFFICE VISIT (OUTPATIENT)
Dept: GASTROENTEROLOGY | Facility: CLINIC | Age: 63
End: 2023-06-08
Payer: COMMERCIAL

## 2023-06-08 VITALS
DIASTOLIC BLOOD PRESSURE: 76 MMHG | TEMPERATURE: 97.3 F | WEIGHT: 223 LBS | HEIGHT: 72 IN | SYSTOLIC BLOOD PRESSURE: 136 MMHG | OXYGEN SATURATION: 97 % | BODY MASS INDEX: 30.2 KG/M2 | HEART RATE: 74 BPM

## 2023-06-08 DIAGNOSIS — Z86.010 HISTORY OF COLON POLYPS: Primary | ICD-10-CM

## 2023-06-08 PROBLEM — Z86.0100 HISTORY OF COLON POLYPS: Status: ACTIVE | Noted: 2023-06-08

## 2023-06-08 RX ORDER — SODIUM PICOSULFATE, MAGNESIUM OXIDE, AND ANHYDROUS CITRIC ACID 10; 3.5; 12 MG/160ML; G/160ML; G/160ML
1 LIQUID ORAL TAKE AS DIRECTED
Qty: 320 ML | Refills: 0 | Status: CANCELLED | OUTPATIENT
Start: 2023-06-08

## 2023-06-08 NOTE — PROGRESS NOTES
Chief Complaint   Patient presents with    Colonoscopy     Needs colon had colon sometime ago    Endoscopy     Having problems getting choked       PCP: Sebastián Monte MD  REFER: No ref. provider found    Subjective     HPI    Ryan Osborn is a 63 y.o. male who presents to office for preventative maintenance. S/p colon resection for ruptured diverticulum 1993.  There is not  a personal history of colon polyps.  There is not a history of colon cancer.  He does not have complaints of nausea/vomiting, change in bowels, weight loss, no BRBPR, no melena.  There is not a family history of colon cancer in first degree relative.  There is not a family history of colon polyps in first degree relative.  Ryan Osborn last colonoscopy-no previous colonoscopy .  Bowels do not move on regular basis.  He has difficulty with constipation.    Today, Ryan Osborn complains of choking on liquids.    He has difficulty with liquids and has been evaluated by ST in past.   He coughs after drinking.  No heartburn, no indigestion.  Compliant with daily protonix.  No weight loss.    UPPER GI ENDOSCOPY (09/04/2020 08:14) -esophagitis       Colonoscopy - Dr Wicho Carranza - 2017 - sessile polyp in right colon    Lab Results - Last 18 Months   Lab Units 09/19/22  0853   GLUCOSE mg/dL 116*   SODIUM mmol/L 133*   POTASSIUM mmol/L 5.1   CREATININE mg/dL 1.18   BUN mg/dL 17   BUN / CREAT RATIO  14.4   ALK PHOS U/L 94   ALT (SGPT) U/L 28   AST (SGOT) U/L 29   BILIRUBIN mg/dL 0.4   ALBUMIN g/dL 4.30       No results for input(s): EGFRIFNONA, EGFRIFAFRI, EGFRRESULT in the last 28924 hours.     Past Medical History:   Diagnosis Date    Anxiety and depression     Bipolar 1 disorder     Depression     Hypertension     Schizophrenia      Past Surgical History:   Procedure Laterality Date    APPENDECTOMY      ENDOSCOPY N/A 9/4/2020    Procedure: ESOPHAGOGASTRODUODENOSCOPY WITH ANESTHESIA;  Surgeon: Xavier Stone DO;  Location: [facility]  ENDOSCOPY;  Service: Gastroenterology;  Laterality: N/A;  pre: painful swallowing  post; esophagitis  Sebastián Monte MD    HERNIA REPAIR       Outpatient Medications Marked as Taking for the 6/8/23 encounter (Office Visit) with Osvaldo Caceres APRN   Medication Sig Dispense Refill    amantadine (SYMMETREL) 100 MG capsule Take 1 capsule by mouth 3 (Three) Times a Day.      clonazePAM (KlonoPIN) 0.5 MG tablet Take 1 tablet by mouth 2 (Two) Times a Day As Needed.      FLUoxetine (PROzac) 20 MG capsule Take 1 capsule by mouth Every Morning.      FLUoxetine (PROzac) 40 MG capsule Take 1 capsule by mouth Every Morning.      lisinopril (PRINIVIL,ZESTRIL) 40 MG tablet Take 1 tablet by mouth Daily. 30 tablet 5    ondansetron ODT (ZOFRAN-ODT) 8 MG disintegrating tablet Place 1 tablet on the tongue Every 8 (Eight) Hours As Needed for Nausea or Vomiting. 20 tablet 0    pantoprazole (PROTONIX) 40 MG EC tablet Take 1 tablet by mouth Every Morning.      propranolol (INDERAL) 20 MG tablet Take 1 tablet by mouth 3 (Three) Times a Day.      tamsulosin (FLOMAX) 0.4 MG capsule 24 hr capsule Take 1 capsule by mouth Daily. 30 capsule 5     Allergies   Allergen Reactions    Aripiprazole Other (See Comments)     Caused pacing     Social History     Socioeconomic History    Marital status: Single   Tobacco Use    Smoking status: Never    Smokeless tobacco: Never   Vaping Use    Vaping Use: Never used   Substance and Sexual Activity    Alcohol use: Never    Drug use: Never    Sexual activity: Defer     Review of Systems   Constitutional:  Negative for fever and unexpected weight change.   HENT:  Negative for trouble swallowing.    Respiratory:  Negative for shortness of breath.    Cardiovascular:  Negative for chest pain.   Gastrointestinal:  Negative for abdominal pain and anal bleeding.   Objective   Vitals:    06/08/23 1446   BP: 136/76   Pulse: 74   Temp: 97.3 °F (36.3 °C)   SpO2: 97%     Physical Exam  Constitutional:        Appearance: Normal appearance. He is well-developed.   Eyes:      General: No scleral icterus.  Cardiovascular:      Heart sounds: Normal heart sounds. No murmur heard.  Pulmonary:      Effort: Pulmonary effort is normal.   Abdominal:      General: Bowel sounds are normal. There is no distension.      Palpations: Abdomen is soft.      Tenderness: There is no abdominal tenderness. There is no guarding.   Skin:     General: Skin is warm and dry.      Coloration: Skin is not jaundiced.   Neurological:      Mental Status: He is alert.   Psychiatric:         Behavior: Behavior is cooperative.     Imaging Results (Most Recent)       None          Body mass index is 30.24 kg/m².    Assessment & Plan   Diagnoses and all orders for this visit:    1. History of colon polyps (Primary)  -     Case Request; Standing  -     Implement Anesthesia Orders Day of Procedure; Standing  -     Obtain Informed Consent; Standing    Other orders  -     polyethylene glycol (GoLYTELY) 236 g solution; Take 3,785 mL by mouth 1 (One) Time for 1 dose. Take as directed  Dispense: 3350 mL; Refill: 0      COLONOSCOPY WITH ANESTHESIA (N/A)    Continue to use miralax as needed     Continue Protonix, would recommend to continue with exercises as previously   Patient is to continue all blood pressure and cardiac medications prior to procedure and has been advised to take medications morning of procedure   Pt verbalized understanding     Advised pt to stop use of NSAIDs, Fish Oil, and MV 5 days prior to procedure, per Dr Stone protocol.  Tylenol based products are ok to take.  Pt verbalized understanding.     All risks, benefits, alternatives, and indications of colonoscopy procedure have been discussed with the patient. Risks to include perforation of the colon requiring possible surgery or colostomy, risk of bleeding from biopsies or removal of colon tissue, possibility of missing a colon polyp or cancer, or adverse drug reaction.  Benefits to include  the diagnosis and management of disease of the colon and rectum. Alternatives to include barium enema, radiographic evaluation, lab testing or no intervention. He verbalizes understanding and agrees.         Osvaldo Caceres, APRN  06/08/23        There are no Patient Instructions on file for this visit.

## 2023-08-28 NOTE — PROGRESS NOTES
[facility] - PODIATRY    Today's Date: 09/05/23    Patient Name: Ryan Osborn  MRN: [id number]  CSN: [id number]  PCP: Sebastián Monte MD  Referring Provider: No ref. provider found    SUBJECTIVE     Chief Complaint   Patient presents with    Establish Care     Sebastián Monte MD 05/05/2023 [facility] last seen 08/2020-pt states he is here today nail/foot care     HPI: Ryan Osborn, a 63 y.o.male, comes to clinic as a(n) new patient complaining of thick fungal toenails . Patient has h/o Schizophrenia, Bipolar 1 Disorder, Depression, Anxiety, HTN . Patient is non-diabetic.  Patient lives with his brother due to assistance with his care. Notes that his toenails were very long, thick, discolored and crumbly. Patient has difficulty caring for himself due to psychological issues. Admits pain at 3/10 level and described as aching and dull. Relates previous treatment(s) including foot care by podiatry . Denies any constitutional symptoms. No other pedal complaints at this time.    Past Medical History:   Diagnosis Date    Anxiety and depression     Bipolar 1 disorder     Depression     Difficulty walking     Hypertension     Schizophrenia      Past Surgical History:   Procedure Laterality Date    APPENDECTOMY      COLONOSCOPY N/A 06/30/2023    Procedure: COLONOSCOPY WITH ANESTHESIA;  Surgeon: Xavier Stone DO;  Location: [facility] ENDOSCOPY;  Service: Gastroenterology;  Laterality: N/A;  Pre: History of colon polyps;  Post: diverticulosis;  Sebastián Monte MD    ENDOSCOPY N/A 09/04/2020    Procedure: ESOPHAGOGASTRODUODENOSCOPY WITH ANESTHESIA;  Surgeon: Xavier Stone DO;  Location: [facility] ENDOSCOPY;  Service: Gastroenterology;  Laterality: N/A;  pre: painful swallowing  post; esophagitis  Sebastián Monte MD    HERNIA REPAIR       Family History   Problem Relation Age of Onset    Colon polyps Brother     Cancer Mother     Diabetes Mother     Hypertension Mother     Osteoporosis Mother     Cancer Father      Diabetes Father     Heart disease Father     Hypertension Father     Colon cancer Neg Hx     Esophageal cancer Neg Hx      Social History     Socioeconomic History    Marital status: Single   Tobacco Use    Smoking status: Never     Passive exposure: Never    Smokeless tobacco: Never   Vaping Use    Vaping Use: Never used   Substance and Sexual Activity    Alcohol use: Never    Drug use: Never    Sexual activity: Defer     Allergies   Allergen Reactions    Aripiprazole Other (See Comments)     Caused pacing     Current Outpatient Medications   Medication Sig Dispense Refill    amantadine (SYMMETREL) 100 MG capsule Take 1 capsule by mouth 3 (Three) Times a Day.      clonazePAM (KlonoPIN) 0.5 MG tablet Take 1 tablet by mouth 2 (Two) Times a Day As Needed.      FLUoxetine (PROzac) 20 MG capsule Take 1 capsule by mouth Every Morning.      FLUoxetine (PROzac) 40 MG capsule Take 1 capsule by mouth Every Morning.      lisinopril (PRINIVIL,ZESTRIL) 40 MG tablet Take 1 tablet by mouth Daily. 30 tablet 5    paliperidone (INVEGA) 6 MG 24 hr tablet Take 1 tablet by mouth Every Morning.      pantoprazole (PROTONIX) 40 MG EC tablet Take 1 tablet by mouth Every Morning.      propranolol (INDERAL) 20 MG tablet Take 1 tablet by mouth 3 (Three) Times a Day.      tamsulosin (FLOMAX) 0.4 MG capsule 24 hr capsule Take 1 capsule by mouth Daily. 30 capsule 5    ondansetron ODT (ZOFRAN-ODT) 8 MG disintegrating tablet Place 1 tablet on the tongue Every 8 (Eight) Hours As Needed for Nausea or Vomiting. 20 tablet 0     No current facility-administered medications for this visit.     Review of Systems   Constitutional:  Negative for chills and fever.   HENT:  Negative for congestion.    Respiratory:  Negative for shortness of breath.    Cardiovascular:  Negative for chest pain and leg swelling.   Gastrointestinal:  Negative for constipation, diarrhea, nausea and vomiting.   Musculoskeletal:         Foot pain   Skin:  Negative for wound.    Neurological:  Negative for numbness.     OBJECTIVE     Vitals:    09/05/23 1544   BP: 128/74   Pulse: 66   SpO2: 96%       PHYSICAL EXAM  GEN:   Accompanied by brother (stayed in [facility]).     Foot/Ankle Exam    GENERAL  Appearance:  appears stated age  Orientation:  AAOx3  Gait:  unimpaired  Assistance:  independent  Right shoe gear: casual shoe  Left shoe gear: casual shoe    VASCULAR     Right Foot Vascularity   Dorsalis pedis:  2+  Posterior tibial:  2+  Skin temperature:  warm  Edema grading:  None  CFT:  3  Pedal hair growth:  Present  Varicosities:  none     Left Foot Vascularity   Dorsalis pedis:  2+  Posterior tibial:  2+  Skin temperature:  warm  Edema grading:  None  CFT:  3  Pedal hair growth:  Present  Varicosities:  none     NEUROLOGIC     Right Foot Neurologic   Normal sensation    Light touch sensation: normal  Vibratory sensation: normal  Hot/Cold sensation: normal  Protective Sensation using [place] Monofilament:   Sites intact: 10  Sites tested: 10     Left Foot Neurologic   Normal sensation    Light touch sensation: normal  Vibratory sensation: normal  Hot/Cold sensation:  normal  Protective Sensation using [place] Monofilament:   Sites intact: 10  Sites tested: 10    MUSCULOSKELETAL     Right Foot Musculoskeletal   Ecchymosis:  none  Tenderness:  toenail problem    Arch:  Normal     Left Foot Musculoskeletal   Ecchymosis:  none  Tenderness:  toenail problem  Arch:  Normal    MUSCLE STRENGTH     Right Foot Muscle Strength   Foot dorsiflexion:  5  Foot plantar flexion:  5  Foot inversion:  5  Foot eversion:  5     Left Foot Muscle Strength   Foot dorsiflexion:  5  Foot plantar flexion:  5  Foot inversion:  5  Foot eversion:  5    RANGE OF MOTION     Right Foot Range of Motion   Foot and ankle ROM within normal limits       Left Foot Range of Motion   Foot and ankle ROM within normal limits      DERMATOLOGIC      Right Foot Dermatologic   Skin  Right foot skin is intact.    Nails  1.  Positive for elongated, onychomycosis, abnormal thickness and subungual debris.  2.  Positive for elongated, onychomycosis, abnormal thickness and subungual debris.  3.  Positive for elongated, onychomycosis, abnormal thickness and subungual debris.  4.  Positive for elongated, onychomycosis, abnormal thickness and subungual debris.  5.  Positive for elongated, onychomycosis, abnormal thickness and subungual debris.     Left Foot Dermatologic   Skin  Left foot skin is intact.   Nails  1.  Positive for elongated, onychomycosis, abnormal thickness and subungual debris.  2.  Positive for elongated, onychomycosis, abnormal thickness and subungual debris.  3.  Positive for elongated, onychomycosis, abnormal thickness and subungual debris.  4.  Positive for elongated, onychomycosis, abnormally thick and subungual debris.  5.  Positive for elongated, onychomycosis, abnormally thick and subungual debris.    RADIOLOGY/NUCLEAR:  No results found.    LABORATORY/CULTURE RESULTS:      PATHOLOGY RESULTS:       ASSESSMENT/PLAN     Diagnoses and all orders for this visit:    1. Onychomycosis (Primary)    2. Pain around toenail    3. Mental health disorder      Comprehensive lower extremity examination and evaluation was performed.  Discussed findings and treatment plan including risks, benefits, and treatment options with patient in detail. Patient agreed with treatment plan.  After verbal consent obtained, nail(s) x10 debrided of length and thickness with nail nipper without incidence  Patient may maintain nails and calluses at home utilizing emery board or pumice stone between visits as needed   Advised patient to check with insurance regarding coverage for ongoing nail care.  ABN signed and in chart.  An After Visit Summary was printed and given to the patient at discharge, including (if requested) any available informative/educational handouts regarding diagnosis, treatment, or medications. All questions were  answered to patient/family satisfaction. Should symptoms fail to improve or worsen they agree to call or return to clinic or to go to the Emergency Department. Discussed the importance of following up with any needed screening tests/labs/specialist appointments and any requested follow-up recommended by me today. Importance of maintaining follow-up discussed and patient accepts that missed appointments can delay diagnosis and potentially lead to worsening of conditions.  Return if symptoms worsen or fail to improve., or sooner if acute issues arise.        This document has been electronically signed by Wilmer Mcclain DPM on September 5, 2023 17:19 CDT

## 2023-08-30 DIAGNOSIS — I10 ESSENTIAL HYPERTENSION: ICD-10-CM

## 2023-08-31 RX ORDER — LISINOPRIL 40 MG/1
TABLET ORAL
Qty: 30 TABLET | Refills: 0 | OUTPATIENT
Start: 2023-08-31

## 2023-09-01 ENCOUNTER — TELEPHONE (OUTPATIENT)
Dept: PODIATRY | Facility: CLINIC | Age: 63
End: 2023-09-01
Payer: COMMERCIAL

## 2023-09-05 ENCOUNTER — OFFICE VISIT (OUTPATIENT)
Dept: PODIATRY | Facility: CLINIC | Age: 63
End: 2023-09-05
Payer: COMMERCIAL

## 2023-09-05 VITALS
WEIGHT: 217 LBS | DIASTOLIC BLOOD PRESSURE: 74 MMHG | HEIGHT: 72 IN | HEART RATE: 66 BPM | BODY MASS INDEX: 29.39 KG/M2 | SYSTOLIC BLOOD PRESSURE: 128 MMHG | OXYGEN SATURATION: 96 %

## 2023-09-05 DIAGNOSIS — F99 MENTAL HEALTH DISORDER: ICD-10-CM

## 2023-09-05 DIAGNOSIS — M79.676 PAIN AROUND TOENAIL: ICD-10-CM

## 2023-09-05 DIAGNOSIS — B35.1 ONYCHOMYCOSIS: Primary | ICD-10-CM

## 2023-09-05 PROCEDURE — 11721 DEBRIDE NAIL 6 OR MORE: CPT | Performed by: PODIATRIST

## 2023-09-05 PROCEDURE — 1160F RVW MEDS BY RX/DR IN RCRD: CPT | Performed by: PODIATRIST

## 2023-09-05 PROCEDURE — 3078F DIAST BP <80 MM HG: CPT | Performed by: PODIATRIST

## 2023-09-05 PROCEDURE — 99203 OFFICE O/P NEW LOW 30 MIN: CPT | Performed by: PODIATRIST

## 2023-09-05 PROCEDURE — 1159F MED LIST DOCD IN RCRD: CPT | Performed by: PODIATRIST

## 2023-09-05 PROCEDURE — 3074F SYST BP LT 130 MM HG: CPT | Performed by: PODIATRIST

## 2023-10-18 DIAGNOSIS — R35.0 URINE FREQUENCY: ICD-10-CM

## 2023-10-18 DIAGNOSIS — I10 ESSENTIAL HYPERTENSION: ICD-10-CM

## 2023-10-18 NOTE — TELEPHONE ENCOUNTER
Caller: Caroline Osborn    Relationship: Emergency Contact    Best call back number: [phone]     Requested Prescriptions:   Requested Prescriptions     Pending Prescriptions Disp Refills    lisinopril (PRINIVIL,ZESTRIL) 40 MG tablet 30 tablet 5     Sig: Take 1 tablet by mouth Daily.    tamsulosin (FLOMAX) 0.4 MG capsule 24 hr capsule 30 capsule 5     Sig: Take 1 capsule by mouth Daily.        Pharmacy where request should be sent: [address] - [phone]  - [phone] [facility]     Last office visit with prescribing clinician: 5/5/2023   Last telemedicine visit with prescribing clinician: Visit date not found   Next office visit with prescribing clinician: 11/3/2023       Does the patient have less than a 3 day supply:  [] Yes  [x] No    Would you like a call back once the refill request has been completed: [] Yes [x] No    If the office needs to give you a call back, can they leave a voicemail: [] Yes [x] No    Akua Richey Rep   10/18/23 15:00 CDT

## 2023-10-19 RX ORDER — TAMSULOSIN HYDROCHLORIDE 0.4 MG/1
1 CAPSULE ORAL DAILY
Qty: 30 CAPSULE | Refills: 0 | Status: SHIPPED | OUTPATIENT
Start: 2023-10-19

## 2023-10-19 RX ORDER — LISINOPRIL 40 MG/1
40 TABLET ORAL DAILY
Qty: 30 TABLET | Refills: 0 | Status: SHIPPED | OUTPATIENT
Start: 2023-10-19

## 2023-11-03 ENCOUNTER — LAB (OUTPATIENT)
Dept: LAB | Facility: HOSPITAL | Age: 63
End: 2023-11-03
Payer: COMMERCIAL

## 2023-11-03 ENCOUNTER — OFFICE VISIT (OUTPATIENT)
Dept: FAMILY MEDICINE CLINIC | Facility: CLINIC | Age: 63
End: 2023-11-03
Payer: COMMERCIAL

## 2023-11-03 VITALS
DIASTOLIC BLOOD PRESSURE: 77 MMHG | OXYGEN SATURATION: 97 % | WEIGHT: 219 LBS | BODY MASS INDEX: 29.66 KG/M2 | HEIGHT: 72 IN | HEART RATE: 74 BPM | SYSTOLIC BLOOD PRESSURE: 117 MMHG

## 2023-11-03 DIAGNOSIS — E55.9 VITAMIN D DEFICIENCY: ICD-10-CM

## 2023-11-03 DIAGNOSIS — I10 ESSENTIAL HYPERTENSION: ICD-10-CM

## 2023-11-03 DIAGNOSIS — Z23 FLU VACCINE NEED: ICD-10-CM

## 2023-11-03 DIAGNOSIS — Z12.5 SCREENING PSA (PROSTATE SPECIFIC ANTIGEN): ICD-10-CM

## 2023-11-03 DIAGNOSIS — Z00.00 WELLNESS EXAMINATION: Primary | ICD-10-CM

## 2023-11-03 DIAGNOSIS — R35.0 URINE FREQUENCY: ICD-10-CM

## 2023-11-03 DIAGNOSIS — Z00.00 WELLNESS EXAMINATION: ICD-10-CM

## 2023-11-03 DIAGNOSIS — D22.9 MULTIPLE ATYPICAL SKIN MOLES: ICD-10-CM

## 2023-11-03 LAB
25(OH)D3 SERPL-MCNC: 30 NG/ML (ref 30–100)
ALBUMIN SERPL-MCNC: 4.2 G/DL (ref 3.5–5)
ALBUMIN/GLOB SERPL: 1.2 G/DL (ref 1.1–2.5)
ALP SERPL-CCNC: 83 U/L (ref 24–120)
ALT SERPL W P-5'-P-CCNC: 31 U/L (ref 0–50)
ANION GAP SERPL CALCULATED.3IONS-SCNC: 7 MMOL/L (ref 4–13)
AST SERPL-CCNC: 30 U/L (ref 7–45)
AUTO MIXED CELLS #: 0.9 10*3/MM3 (ref 0.1–2.6)
AUTO MIXED CELLS %: 14.9 % (ref 0.1–24)
BILIRUB SERPL-MCNC: 0.5 MG/DL (ref 0.1–1)
BILIRUB UR QL STRIP: NEGATIVE
BUN SERPL-MCNC: 17 MG/DL (ref 5–21)
BUN/CREAT SERPL: 16.7
CALCIUM SPEC-SCNC: 9.3 MG/DL (ref 8.4–10.4)
CHLORIDE SERPL-SCNC: 102 MMOL/L (ref 98–110)
CHOLEST SERPL-MCNC: 156 MG/DL (ref 130–200)
CLARITY UR: CLEAR
CO2 SERPL-SCNC: 28 MMOL/L (ref 24–31)
COLOR UR: YELLOW
CREAT SERPL-MCNC: 1.02 MG/DL (ref 0.5–1.4)
EGFRCR SERPLBLD CKD-EPI 2021: 82.6 ML/MIN/1.73
ERYTHROCYTE [DISTWIDTH] IN BLOOD BY AUTOMATED COUNT: 12.2 % (ref 12.3–15.4)
GLOBULIN UR ELPH-MCNC: 3.4 GM/DL
GLUCOSE SERPL-MCNC: 113 MG/DL (ref 70–100)
GLUCOSE UR STRIP-MCNC: NEGATIVE MG/DL
HCT VFR BLD AUTO: 38 % (ref 37.5–51)
HDLC SERPL-MCNC: 40 MG/DL
HGB BLD-MCNC: 13.4 G/DL (ref 13–17.7)
HGB UR QL STRIP.AUTO: NEGATIVE
KETONES UR QL STRIP: NEGATIVE
LDLC SERPL CALC-MCNC: 93 MG/DL (ref 0–99)
LDLC/HDLC SERPL: 2.25 {RATIO}
LEUKOCYTE ESTERASE UR QL STRIP.AUTO: NEGATIVE
LYMPHOCYTES # BLD AUTO: 2.7 10*3/MM3 (ref 0.7–3.1)
LYMPHOCYTES NFR BLD AUTO: 46.3 % (ref 19.6–45.3)
MCH RBC QN AUTO: 32.3 PG (ref 26.6–33)
MCHC RBC AUTO-ENTMCNC: 35.3 G/DL (ref 31.5–35.7)
MCV RBC AUTO: 91.6 FL (ref 79–97)
NEUTROPHILS NFR BLD AUTO: 2.2 10*3/MM3 (ref 1.7–7)
NEUTROPHILS NFR BLD AUTO: 38.8 % (ref 42.7–76)
NITRITE UR QL STRIP: NEGATIVE
PH UR STRIP.AUTO: 6 [PH] (ref 5–8)
PLATELET # BLD AUTO: 293 10*3/MM3 (ref 140–450)
PMV BLD AUTO: 7.9 FL (ref 6–12)
POTASSIUM SERPL-SCNC: 4 MMOL/L (ref 3.5–5.3)
PROT SERPL-MCNC: 7.6 G/DL (ref 6.3–8.7)
PROT UR QL STRIP: NEGATIVE
PSA SERPL-MCNC: 0.31 NG/ML (ref 0–4)
RBC # BLD AUTO: 4.15 10*6/MM3 (ref 4.14–5.8)
SODIUM SERPL-SCNC: 137 MMOL/L (ref 135–145)
SP GR UR STRIP: 1.02 (ref 1–1.03)
TRIGL SERPL-MCNC: 131 MG/DL (ref 0–149)
TSH SERPL DL<=0.05 MIU/L-ACNC: 1.08 UIU/ML (ref 0.27–4.2)
UROBILINOGEN UR QL STRIP: NORMAL
VLDLC SERPL-MCNC: 23 MG/DL (ref 5–40)
WBC NRBC COR # BLD: 5.8 10*3/MM3 (ref 3.4–10.8)

## 2023-11-03 PROCEDURE — 36415 COLL VENOUS BLD VENIPUNCTURE: CPT

## 2023-11-03 PROCEDURE — 82306 VITAMIN D 25 HYDROXY: CPT

## 2023-11-03 PROCEDURE — 80061 LIPID PANEL: CPT

## 2023-11-03 PROCEDURE — G0103 PSA SCREENING: HCPCS

## 2023-11-03 PROCEDURE — 81003 URINALYSIS AUTO W/O SCOPE: CPT

## 2023-11-03 PROCEDURE — 80050 GENERAL HEALTH PANEL: CPT

## 2023-11-03 RX ORDER — TAMSULOSIN HYDROCHLORIDE 0.4 MG/1
1 CAPSULE ORAL DAILY
Qty: 30 CAPSULE | Refills: 5 | Status: SHIPPED | OUTPATIENT
Start: 2023-11-03

## 2023-11-03 RX ORDER — LISINOPRIL 40 MG/1
40 TABLET ORAL DAILY
Qty: 30 TABLET | Refills: 5 | Status: SHIPPED | OUTPATIENT
Start: 2023-11-03

## 2023-11-03 NOTE — PROGRESS NOTES
"Chief Complaint  Annual Exam, Hypertension, and Mood Disorder    Subjective    History of Present Illness      Patient presents to [facility] for   History of Present Illness  Pt is here today for 6 month med check and Annual Exam. He reports no problems or concerns at this time.        Review of Systems    I have reviewed and agree with the HPI information as above.  Valerie Kelly, APRN     Objective   Vital Signs:   /77   Pulse 74   Ht 182.9 cm (72\")   Wt 99.3 kg (219 lb)   SpO2 97%   BMI 29.70 kg/m²            Physical Exam  Vitals and nursing note reviewed. Exam conducted with a chaperone present.   Constitutional:       Appearance: Normal appearance. He is well-developed.   HENT:      Head: Normocephalic and atraumatic.      Right Ear: Tympanic membrane, ear canal and external ear normal.      Left Ear: Tympanic membrane, ear canal and external ear normal.      Nose: Nose normal. No septal deviation, nasal tenderness or congestion.      Mouth/Throat:      Lips: Pink. No lesions.      Mouth: Mucous membranes are moist. No oral lesions.      Dentition: Normal dentition.      Pharynx: Oropharynx is clear. No pharyngeal swelling, oropharyngeal exudate or posterior oropharyngeal erythema.   Eyes:      General: Lids are normal. Vision grossly intact. No scleral icterus.        Right eye: No discharge.         Left eye: No discharge.      Extraocular Movements: Extraocular movements intact.      Conjunctiva/sclera: Conjunctivae normal.      Right eye: Right conjunctiva is not injected.      Left eye: Left conjunctiva is not injected.      Pupils: Pupils are equal, round, and reactive to light.   Neck:      Thyroid: No thyroid mass.      Trachea: Trachea normal.   Cardiovascular:      Rate and Rhythm: Normal rate and regular rhythm.      Heart sounds: Normal heart sounds. No murmur heard.     No gallop.   Pulmonary:      Effort: Pulmonary effort is normal.      Breath " sounds: Normal breath sounds and air entry. No wheezing, rhonchi or rales.   Musculoskeletal:         General: No tenderness or deformity. Normal range of motion.      Cervical back: Full passive range of motion without pain, normal range of motion and neck supple.      Thoracic back: Normal.      Right lower leg: No edema.      Left lower leg: No edema.   Skin:     General: Skin is warm and dry.      Coloration: Skin is not jaundiced.      Findings: No rash.   Neurological:      Mental Status: He is alert and oriented to person, place, and time. Mental status is at baseline.      Sensory: Sensation is intact.      Motor: Motor function is intact.      Coordination: Coordination is intact.      Gait: Gait is intact.      Deep Tendon Reflexes: Reflexes are normal and symmetric.   Psychiatric:         Mood and Affect: Mood and affect normal.         Behavior: Behavior normal.         Judgment: Judgment normal.        Result Review  Data Reviewed:                   Assessment and Plan      Diagnoses and all orders for this visit:    1. Wellness examination (Primary)  -     PSA SCREENING; Future  -     CBC Auto Differential; Future  -     Comprehensive Metabolic Panel; Future  -     Lipid Panel; Future  -     TSH; Future  -     Urinalysis With Culture If Indicated - Urine, Clean Catch; Future  -     Vitamin D,25-Hydroxy; Future    2. Flu vaccine need  -     Fluzone >6 Months ([id number])    3. Screening PSA (prostate specific antigen)  -     PSA SCREENING; Future    4. Vitamin D deficiency  -     Vitamin D,25-Hydroxy; Future    5. Essential hypertension  -     lisinopril (PRINIVIL,ZESTRIL) 40 MG tablet; Take 1 tablet by mouth Daily.  Dispense: 30 tablet; Refill: 5    6. Urine frequency  -     tamsulosin (FLOMAX) 0.4 MG capsule 24 hr capsule; Take 1 capsule by mouth Daily.  Dispense: 30 capsule; Refill: 5    7. Multiple atypical skin moles  -     Cancel: Ambulatory Referral to Dermatology  -     Ambulatory Referral to  Dermatology    Patient is here with his brother today for a yearly physical.  Patient is up-to-date on his colonoscopy.  Patient agreed to flu vaccine today.  We will get labs today.  Blood pressure is well controlled, will continue his blood pressure medication the same.  Patient's only complaint at this time is moles to his back.  Brother is worried that they could be more than just moles.  Will refer to dermatology at this time.        Follow Up   Return in about 6 months (around 5/3/2024).  Patient was given instructions and counseling regarding his condition or for health maintenance advice. Please see specific information pulled into the AVS if appropriate.     Preventive Care 40-64 Years Old, Male  Preventive care refers to lifestyle choices and visits with your health care provider that can promote health and wellness. Preventive care visits are also called wellness exams.  What can I expect for my preventive care visit?  Counseling  During your preventive care visit, your health care provider may ask about your:  Medical history, including:  Past medical problems.  Family medical history.  Current health, including:  Emotional well-being.  Home life and relationship well-being.  Sexual activity.  Lifestyle, including:  Alcohol, nicotine or tobacco, and drug use.  Access to firearms.  Diet, exercise, and sleep habits.  Safety issues such as seatbelt and bike helmet use.  Sunscreen use.  Work and work environment.  Physical exam  Your health care provider will check your:  Height and weight. These may be used to calculate your BMI (body mass index). BMI is a measurement that tells if you are at a healthy weight.  Waist circumference. This measures the distance around your waistline. This measurement also tells if you are at a healthy weight and may help predict your risk of certain diseases, such as type 2 diabetes and high blood pressure.  Heart rate and blood pressure.  Body temperature.  Skin for abnormal  spots.  What immunizations do I need?    Vaccines are usually given at various ages, according to a schedule. Your health care provider will recommend vaccines for you based on your age, medical history, and lifestyle or other factors, such as travel or where you work.  What tests do I need?  Screening  Your health care provider may recommend screening tests for certain conditions. This may include:  Lipid and cholesterol levels.  Diabetes screening. This is done by checking your blood sugar (glucose) after you have not eaten for a while (fasting).  Hepatitis B test.  Hepatitis C test.  HIV (human immunodeficiency virus) test.  STI (sexually transmitted infection) testing, if you are at risk.  Lung cancer screening.  Prostate cancer screening.  Colorectal cancer screening.  Talk with your health care provider about your test results, treatment options, and if necessary, the need for more tests.  Follow these instructions at home:  Eating and drinking    Eat a diet that includes fresh fruits and vegetables, whole grains, lean protein, and low-fat dairy products.  Take vitamin and mineral supplements as recommended by your health care provider.  Do not drink alcohol if your health care provider tells you not to drink.  If you drink alcohol:  Limit how much you have to 0-2 drinks a day.  Know how much alcohol is in your drink. In the [place] one drink equals one 12 oz bottle of beer (355 mL), one 5 oz glass of wine (148 mL), or one 1½ oz glass of hard liquor (44 mL).  Lifestyle  Brush your teeth every morning and night with fluoride toothpaste. Floss one time each day.  Exercise for at least 30 minutes 5 or more days each week.  Do not use any products that contain nicotine or tobacco. These products include cigarettes, chewing tobacco, and vaping devices, such as e-cigarettes. If you need help quitting, ask your health care provider.  Do not use drugs.  If you are sexually active, practice safe sex. Use a condom or  other form of protection to prevent STIs.  Take aspirin only as told by your health care provider. Make sure that you understand how much to take and what form to take. Work with your health care provider to find out whether it is safe and beneficial for you to take aspirin daily.  Find healthy ways to manage stress, such as:  Meditation, yoga, or listening to music.  Journaling.  Talking to a trusted person.  Spending time with friends and family.  Minimize exposure to UV radiation to reduce your risk of skin cancer.  Safety  Always wear your seat belt while driving or riding in a vehicle.  Do not drive:  If you have been drinking alcohol. Do not ride with someone who has been drinking.  When you are tired or distracted.  While texting.  If you have been using any mind-altering substances or drugs.  Wear a helmet and other protective equipment during sports activities.  If you have firearms in your house, make sure you follow all gun safety procedures.  What's next?  Go to your health care provider once a year for an annual wellness visit.  Ask your health care provider how often you should have your eyes and teeth checked.  Stay up to date on all vaccines.  This information is not intended to replace advice given to you by your health care provider. Make sure you discuss any questions you have with your health care provider.  Document Revised: 06/15/2022 Document Reviewed: 06/15/2022  [employer] Patient Education © 2023 [employer]

## 2023-11-08 ENCOUNTER — TELEPHONE (OUTPATIENT)
Dept: FAMILY MEDICINE CLINIC | Facility: CLINIC | Age: 63
End: 2023-11-08
Payer: COMMERCIAL

## 2024-05-03 ENCOUNTER — TELEPHONE (OUTPATIENT)
Dept: FAMILY MEDICINE CLINIC | Facility: CLINIC | Age: 64
End: 2024-05-03
Payer: COMMERCIAL

## 2024-05-03 NOTE — TELEPHONE ENCOUNTER
Spoke to patient regarding no show 05/03/2024. Explained to him our no show policy. He VU and rescheduled appointment.

## 2024-05-07 ENCOUNTER — OFFICE VISIT (OUTPATIENT)
Dept: FAMILY MEDICINE CLINIC | Facility: CLINIC | Age: 64
End: 2024-05-07
Payer: COMMERCIAL

## 2024-05-07 VITALS
HEIGHT: 72 IN | SYSTOLIC BLOOD PRESSURE: 111 MMHG | WEIGHT: 233 LBS | RESPIRATION RATE: 20 BRPM | TEMPERATURE: 97.8 F | BODY MASS INDEX: 31.56 KG/M2 | DIASTOLIC BLOOD PRESSURE: 76 MMHG

## 2024-05-07 DIAGNOSIS — R25.1 TREMOR: ICD-10-CM

## 2024-05-07 DIAGNOSIS — K21.9 GASTROESOPHAGEAL REFLUX DISEASE WITHOUT ESOPHAGITIS: ICD-10-CM

## 2024-05-07 DIAGNOSIS — R35.0 URINE FREQUENCY: ICD-10-CM

## 2024-05-07 DIAGNOSIS — G47.33 OBSTRUCTIVE SLEEP APNEA: ICD-10-CM

## 2024-05-07 DIAGNOSIS — F41.9 ANXIETY: ICD-10-CM

## 2024-05-07 DIAGNOSIS — Z99.89 CPAP (CONTINUOUS POSITIVE AIRWAY PRESSURE) DEPENDENCE: ICD-10-CM

## 2024-05-07 DIAGNOSIS — F20.0 PARANOID SCHIZOPHRENIA: Primary | ICD-10-CM

## 2024-05-07 DIAGNOSIS — I10 ESSENTIAL HYPERTENSION: ICD-10-CM

## 2024-05-07 PROCEDURE — 3074F SYST BP LT 130 MM HG: CPT | Performed by: PEDIATRICS

## 2024-05-07 PROCEDURE — 3078F DIAST BP <80 MM HG: CPT | Performed by: PEDIATRICS

## 2024-05-07 PROCEDURE — 1160F RVW MEDS BY RX/DR IN RCRD: CPT | Performed by: PEDIATRICS

## 2024-05-07 PROCEDURE — 99214 OFFICE O/P EST MOD 30 MIN: CPT | Performed by: PEDIATRICS

## 2024-05-07 PROCEDURE — 1159F MED LIST DOCD IN RCRD: CPT | Performed by: PEDIATRICS

## 2024-05-07 RX ORDER — PANTOPRAZOLE SODIUM 40 MG/1
40 TABLET, DELAYED RELEASE ORAL EVERY MORNING
Qty: 30 TABLET | Refills: 2 | Status: SHIPPED | OUTPATIENT
Start: 2024-05-07

## 2024-05-07 RX ORDER — PALIPERIDONE 6 MG/1
6 TABLET, EXTENDED RELEASE ORAL EVERY MORNING
Qty: 30 TABLET | Refills: 2 | Status: SHIPPED | OUTPATIENT
Start: 2024-05-07

## 2024-05-07 RX ORDER — AMANTADINE HYDROCHLORIDE 100 MG/1
100 CAPSULE, GELATIN COATED ORAL 3 TIMES DAILY
Qty: 90 CAPSULE | Refills: 2 | Status: SHIPPED | OUTPATIENT
Start: 2024-05-07

## 2024-05-07 RX ORDER — PROPRANOLOL HYDROCHLORIDE 20 MG/1
20 TABLET ORAL 3 TIMES DAILY
Qty: 90 TABLET | Refills: 2 | Status: SHIPPED | OUTPATIENT
Start: 2024-05-07

## 2024-05-07 RX ORDER — TAMSULOSIN HYDROCHLORIDE 0.4 MG/1
1 CAPSULE ORAL DAILY
Qty: 30 CAPSULE | Refills: 2 | Status: SHIPPED | OUTPATIENT
Start: 2024-05-07

## 2024-05-07 RX ORDER — FLUOXETINE HYDROCHLORIDE 20 MG/1
20 CAPSULE ORAL EVERY MORNING
Qty: 30 CAPSULE | Refills: 2 | Status: SHIPPED | OUTPATIENT
Start: 2024-05-07

## 2024-05-07 RX ORDER — LISINOPRIL 40 MG/1
40 TABLET ORAL DAILY
Qty: 30 TABLET | Refills: 2 | Status: SHIPPED | OUTPATIENT
Start: 2024-05-07

## 2024-05-07 RX ORDER — FLUOXETINE HYDROCHLORIDE 40 MG/1
40 CAPSULE ORAL EVERY MORNING
Qty: 30 CAPSULE | Refills: 2 | Status: SHIPPED | OUTPATIENT
Start: 2024-05-07

## 2024-08-07 ENCOUNTER — OFFICE VISIT (OUTPATIENT)
Dept: FAMILY MEDICINE CLINIC | Facility: CLINIC | Age: 64
End: 2024-08-07
Payer: COMMERCIAL

## 2024-08-07 VITALS
BODY MASS INDEX: 31.83 KG/M2 | DIASTOLIC BLOOD PRESSURE: 77 MMHG | SYSTOLIC BLOOD PRESSURE: 126 MMHG | HEIGHT: 72 IN | OXYGEN SATURATION: 96 % | HEART RATE: 62 BPM | WEIGHT: 235 LBS

## 2024-08-07 DIAGNOSIS — K21.9 GASTROESOPHAGEAL REFLUX DISEASE WITHOUT ESOPHAGITIS: ICD-10-CM

## 2024-08-07 DIAGNOSIS — I10 ESSENTIAL HYPERTENSION: ICD-10-CM

## 2024-08-07 DIAGNOSIS — R35.0 URINE FREQUENCY: ICD-10-CM

## 2024-08-07 DIAGNOSIS — R25.1 TREMOR: ICD-10-CM

## 2024-08-07 DIAGNOSIS — F41.9 ANXIETY: ICD-10-CM

## 2024-08-07 DIAGNOSIS — F20.0 PARANOID SCHIZOPHRENIA: ICD-10-CM

## 2024-08-07 PROCEDURE — 3078F DIAST BP <80 MM HG: CPT | Performed by: PEDIATRICS

## 2024-08-07 PROCEDURE — 99214 OFFICE O/P EST MOD 30 MIN: CPT | Performed by: PEDIATRICS

## 2024-08-07 PROCEDURE — 3074F SYST BP LT 130 MM HG: CPT | Performed by: PEDIATRICS

## 2024-08-07 RX ORDER — PALIPERIDONE 6 MG/1
6 TABLET, EXTENDED RELEASE ORAL EVERY MORNING
Qty: 30 TABLET | Refills: 2 | Status: SHIPPED | OUTPATIENT
Start: 2024-08-07

## 2024-08-07 RX ORDER — FLUOXETINE HYDROCHLORIDE 40 MG/1
40 CAPSULE ORAL EVERY MORNING
Qty: 30 CAPSULE | Refills: 2 | Status: SHIPPED | OUTPATIENT
Start: 2024-08-07

## 2024-08-07 RX ORDER — PANTOPRAZOLE SODIUM 40 MG/1
40 TABLET, DELAYED RELEASE ORAL EVERY MORNING
Qty: 30 TABLET | Refills: 2 | Status: SHIPPED | OUTPATIENT
Start: 2024-08-07

## 2024-08-07 RX ORDER — PROPRANOLOL HYDROCHLORIDE 20 MG/1
20 TABLET ORAL 3 TIMES DAILY
Qty: 90 TABLET | Refills: 2 | Status: SHIPPED | OUTPATIENT
Start: 2024-08-07

## 2024-08-07 RX ORDER — FLUOXETINE HYDROCHLORIDE 20 MG/1
20 CAPSULE ORAL EVERY MORNING
Qty: 30 CAPSULE | Refills: 2 | Status: SHIPPED | OUTPATIENT
Start: 2024-08-07

## 2024-08-07 RX ORDER — AMANTADINE HYDROCHLORIDE 100 MG/1
100 CAPSULE, GELATIN COATED ORAL 3 TIMES DAILY
Qty: 90 CAPSULE | Refills: 2 | Status: SHIPPED | OUTPATIENT
Start: 2024-08-07

## 2024-08-07 RX ORDER — LISINOPRIL 40 MG/1
40 TABLET ORAL DAILY
Qty: 30 TABLET | Refills: 2 | Status: SHIPPED | OUTPATIENT
Start: 2024-08-07

## 2024-08-07 RX ORDER — TAMSULOSIN HYDROCHLORIDE 0.4 MG/1
1 CAPSULE ORAL DAILY
Qty: 30 CAPSULE | Refills: 2 | Status: SHIPPED | OUTPATIENT
Start: 2024-08-07

## 2024-08-07 NOTE — PROGRESS NOTES
"Chief Complaint  Paranoid Schizophrenia, Anxiety, Hypertension, Urinary Frequency, Tremors, and Heartburn    Subjective    History of Present Illness      Patient presents to [facility] for   History of Present Illness  Pt is here today for 3 month med check and refills for Paranoid Schizophrenia, Anxiety, Hypertension, Urinary Frequency, Tremors, and Heartburn.  He reports continued improvement in symptoms. No problems or concerns to mention at this time.         Review of Systems    I have reviewed and agree with the HPI information as above.  Sebastián Monte MD     Objective   Vital Signs:   /77   Pulse 62   Ht 182.9 cm (72\")   Wt 107 kg (235 lb)   SpO2 96%   BMI 31.87 kg/m²            Physical Exam  Constitutional:       Appearance: Normal appearance. He is normal weight.   Cardiovascular:      Rate and Rhythm: Normal rate and regular rhythm.      Heart sounds: Normal heart sounds.   Pulmonary:      Effort: Pulmonary effort is normal.      Breath sounds: Normal breath sounds.   Neurological:      Mental Status: He is alert.   Psychiatric:         Mood and Affect: Mood normal.         Behavior: Behavior normal.          MARKUS-7:      PHQ-2 Depression Screening  Little interest or pleasure in doing things?     Feeling down, depressed, or hopeless?     PHQ-2 Total Score       PHQ-9 Depression Screening  Little interest or pleasure in doing things?     Feeling down, depressed, or hopeless?     Trouble falling or staying asleep, or sleeping too much?     Feeling tired or having little energy?     Poor appetite or overeating?     Feeling bad about yourself - or that you are a failure or have let yourself or your family down?     Trouble concentrating on things, such as reading the newspaper or watching television?     Moving or speaking so slowly that other people could have noticed? Or the opposite - being so fidgety or restless that you have been moving around a lot more than " usual?     Thoughts that you would be better off dead, or of hurting yourself in some way?     PHQ-9 Total Score     If you checked off any problems, how difficult have these problems made it for you to do your work, take care of things at home, or get along with other people?        Result Review  Data Reviewed:                   Assessment and Plan      Diagnoses and all orders for this visit:    1. Tremor  Assessment & Plan:  No tremor noted today    Orders:  -     amantadine (SYMMETREL) 100 MG capsule; Take 1 capsule by mouth 3 (Three) Times a Day.  Dispense: 90 capsule; Refill: 2    2. Anxiety  Assessment & Plan:  Stable on medication    Orders:  -     FLUoxetine (PROzac) 20 MG capsule; Take 1 capsule by mouth Every Morning.  Dispense: 30 capsule; Refill: 2  -     FLUoxetine (PROzac) 40 MG capsule; Take 1 capsule by mouth Every Morning.  Dispense: 30 capsule; Refill: 2    3. Essential hypertension  Assessment & Plan:  Bp is very stable and normal    Orders:  -     lisinopril (PRINIVIL,ZESTRIL) 40 MG tablet; Take 1 tablet by mouth Daily.  Dispense: 30 tablet; Refill: 2  -     propranolol (INDERAL) 20 MG tablet; Take 1 tablet by mouth 3 (Three) Times a Day.  Dispense: 90 tablet; Refill: 2    4. Paranoid schizophrenia  Assessment & Plan:  Psychological condition is stable.  Continue current treatment regimen.  Psychological condition  will be reassessed in 3 months.    Orders:  -     paliperidone (INVEGA) 6 MG 24 hr tablet; Take 1 tablet by mouth Every Morning.  Dispense: 30 tablet; Refill: 2    5. Gastroesophageal reflux disease without esophagitis  Assessment & Plan:  Does well witrh positioning and medication    Orders:  -     pantoprazole (PROTONIX) 40 MG EC tablet; Take 1 tablet by mouth Every Morning.  Dispense: 30 tablet; Refill: 2    6. Urine frequency  Assessment & Plan:  No longer an issues   he is happy with medication    Orders:  -     tamsulosin (FLOMAX) 0.4 MG capsule 24 hr capsule; Take 1 capsule by  mouth Daily.  Dispense: 30 capsule; Refill: 2            Follow Up   Return in about 3 months (around 11/7/2024) for needs lab work.  Patient was given instructions and counseling regarding his condition or for health maintenance advice. Please see specific information pulled into the AVS if appropriate.

## 2024-11-06 ENCOUNTER — LAB (OUTPATIENT)
Dept: LAB | Facility: HOSPITAL | Age: 64
End: 2024-11-06
Payer: COMMERCIAL

## 2024-11-06 ENCOUNTER — OFFICE VISIT (OUTPATIENT)
Dept: FAMILY MEDICINE CLINIC | Facility: CLINIC | Age: 64
End: 2024-11-06
Payer: COMMERCIAL

## 2024-11-06 VITALS
BODY MASS INDEX: 32.64 KG/M2 | DIASTOLIC BLOOD PRESSURE: 84 MMHG | RESPIRATION RATE: 18 BRPM | HEIGHT: 72 IN | SYSTOLIC BLOOD PRESSURE: 135 MMHG | WEIGHT: 241 LBS | TEMPERATURE: 97.9 F | HEART RATE: 58 BPM

## 2024-11-06 DIAGNOSIS — R35.0 URINE FREQUENCY: ICD-10-CM

## 2024-11-06 DIAGNOSIS — I10 ESSENTIAL HYPERTENSION: ICD-10-CM

## 2024-11-06 LAB
ALBUMIN SERPL-MCNC: 4.1 G/DL (ref 3.5–5)
ALBUMIN/GLOB SERPL: 1.3 G/DL (ref 1.1–2.5)
ALP SERPL-CCNC: 103 U/L (ref 24–120)
ALT SERPL W P-5'-P-CCNC: 28 U/L (ref 0–50)
ANION GAP SERPL CALCULATED.3IONS-SCNC: 7 MMOL/L (ref 4–13)
AST SERPL-CCNC: 27 U/L (ref 7–45)
AUTO MIXED CELLS #: 0.9 10*3/MM3 (ref 0.1–2.6)
AUTO MIXED CELLS %: 15.1 % (ref 0.1–24)
BILIRUB SERPL-MCNC: 0.2 MG/DL (ref 0.1–1)
BILIRUB UR QL STRIP: NEGATIVE
BUN SERPL-MCNC: 10 MG/DL (ref 5–21)
BUN/CREAT SERPL: 9.1
CALCIUM SPEC-SCNC: 8.9 MG/DL (ref 8.6–10.5)
CHLORIDE SERPL-SCNC: 100 MMOL/L (ref 98–110)
CHOLEST SERPL-MCNC: 166 MG/DL (ref 130–200)
CLARITY UR: CLEAR
CO2 SERPL-SCNC: 29 MMOL/L (ref 24–31)
COLOR UR: YELLOW
CREAT SERPL-MCNC: 1.1 MG/DL (ref 0.5–1.4)
EGFRCR SERPLBLD CKD-EPI 2021: 75 ML/MIN/1.73
ERYTHROCYTE [DISTWIDTH] IN BLOOD BY AUTOMATED COUNT: 12.1 % (ref 12.3–15.4)
GLOBULIN UR ELPH-MCNC: 3.1 GM/DL
GLUCOSE SERPL-MCNC: 117 MG/DL (ref 70–100)
GLUCOSE UR STRIP-MCNC: NEGATIVE MG/DL
HCT VFR BLD AUTO: 35.4 % (ref 37.5–51)
HDLC SERPL-MCNC: 40 MG/DL
HGB BLD-MCNC: 12.2 G/DL (ref 13–17.7)
HGB UR QL STRIP.AUTO: NEGATIVE
KETONES UR QL STRIP: NEGATIVE
LDLC SERPL CALC-MCNC: 86 MG/DL (ref 0–99)
LDLC/HDLC SERPL: 1.96 {RATIO}
LEUKOCYTE ESTERASE UR QL STRIP.AUTO: NEGATIVE
LYMPHOCYTES # BLD AUTO: 3.3 10*3/MM3 (ref 0.7–3.1)
LYMPHOCYTES NFR BLD AUTO: 53.4 % (ref 19.6–45.3)
MCH RBC QN AUTO: 31.4 PG (ref 26.6–33)
MCHC RBC AUTO-ENTMCNC: 34.5 G/DL (ref 31.5–35.7)
MCV RBC AUTO: 91 FL (ref 79–97)
NEUTROPHILS NFR BLD AUTO: 2 10*3/MM3 (ref 1.7–7)
NEUTROPHILS NFR BLD AUTO: 31.5 % (ref 42.7–76)
NITRITE UR QL STRIP: NEGATIVE
PH UR STRIP.AUTO: 6 [PH] (ref 5–8)
PLATELET # BLD AUTO: 279 10*3/MM3 (ref 140–450)
PMV BLD AUTO: 7.9 FL (ref 6–12)
POTASSIUM SERPL-SCNC: 4.2 MMOL/L (ref 3.5–5.3)
PROT SERPL-MCNC: 7.2 G/DL (ref 6.3–8.7)
PROT UR QL STRIP: NEGATIVE
RBC # BLD AUTO: 3.89 10*6/MM3 (ref 4.14–5.8)
SODIUM SERPL-SCNC: 136 MMOL/L (ref 135–145)
SP GR UR STRIP: <=1.005 (ref 1–1.03)
TRIGL SERPL-MCNC: 239 MG/DL (ref 0–149)
UROBILINOGEN UR QL STRIP: NORMAL
VLDLC SERPL-MCNC: 40 MG/DL (ref 5–40)
WBC NRBC COR # BLD AUTO: 6.2 10*3/MM3 (ref 3.4–10.8)

## 2024-11-06 PROCEDURE — 80053 COMPREHEN METABOLIC PANEL: CPT

## 2024-11-06 PROCEDURE — 3079F DIAST BP 80-89 MM HG: CPT | Performed by: PEDIATRICS

## 2024-11-06 PROCEDURE — 85025 COMPLETE CBC W/AUTO DIFF WBC: CPT

## 2024-11-06 PROCEDURE — 36415 COLL VENOUS BLD VENIPUNCTURE: CPT

## 2024-11-06 PROCEDURE — 3075F SYST BP GE 130 - 139MM HG: CPT | Performed by: PEDIATRICS

## 2024-11-06 PROCEDURE — 80061 LIPID PANEL: CPT

## 2024-11-06 PROCEDURE — 81003 URINALYSIS AUTO W/O SCOPE: CPT

## 2024-11-06 PROCEDURE — 99214 OFFICE O/P EST MOD 30 MIN: CPT | Performed by: PEDIATRICS

## 2024-11-06 RX ORDER — PROPRANOLOL HCL 20 MG
20 TABLET ORAL 3 TIMES DAILY
Qty: 90 TABLET | Refills: 2 | Status: SHIPPED | OUTPATIENT
Start: 2024-11-06

## 2024-11-06 RX ORDER — TAMSULOSIN HYDROCHLORIDE 0.4 MG/1
1 CAPSULE ORAL DAILY
Qty: 30 CAPSULE | Refills: 2 | Status: SHIPPED | OUTPATIENT
Start: 2024-11-06

## 2024-11-06 RX ORDER — LISINOPRIL 40 MG/1
40 TABLET ORAL DAILY
Qty: 90 TABLET | Refills: 1 | Status: SHIPPED | OUTPATIENT
Start: 2024-11-06

## 2024-11-06 NOTE — PROGRESS NOTES
"Chief Complaint  Mood, Med Refill, and Hypertension    Subjective    History of Present Illness      Patient presents to [facility] for   History of Present Illness  States he is grieving a lost pet and his sister had a stroke. He is unable to verify his medications. He does see Dr. Ortega and gets some refills there. He thinks that we only fill his b/p med's. He states he lives with his brother and his nephew brought him today.        Review of Systems    I have reviewed and agree with the HPI information as above.  Sebastián Monte MD     Objective   Vital Signs:   /84   Pulse 58   Temp 97.9 °F (36.6 °C)   Resp 18   Ht 182.9 cm (72\")   Wt 109 kg (241 lb)   BMI 32.69 kg/m²            Physical Exam  Constitutional:       Appearance: Normal appearance. He is normal weight.   Cardiovascular:      Rate and Rhythm: Normal rate and regular rhythm.      Heart sounds: Normal heart sounds.   Pulmonary:      Effort: Pulmonary effort is normal.      Breath sounds: Normal breath sounds.   Neurological:      Mental Status: He is alert.   Psychiatric:         Mood and Affect: Mood normal.         Behavior: Behavior normal.          MARKUS-7:      PHQ-2 Depression Screening    Little interest or pleasure in doing things?     Feeling down, depressed, or hopeless?     PHQ-2 Total Score        PHQ-9 Depression Screening  Little interest or pleasure in doing things?     Feeling down, depressed, or hopeless?     PHQ-2 Total Score     Trouble falling or staying asleep, or sleeping too much?     Feeling tired or having little energy?     Poor appetite or overeating?     Feeling bad about yourself - or that you are a failure or have let yourself or your family down?     Trouble concentrating on things, such as reading the newspaper or watching television?     Moving or speaking so slowly that other people could have noticed? Or the opposite - being so fidgety or restless that you have been moving " around a lot more than usual?     Thoughts that you would be better off dead, or of hurting yourself in some way?     PHQ-9 Total Score     If you checked off any problems, how difficult have these problems made it for you to do your work, take care of things at home, or get along with other people?             Result Review  Data Reviewed:                   Assessment and Plan      Diagnoses and all orders for this visit:    1. Essential hypertension  Assessment & Plan:  Bp is stable and medication was refilled    Orders:  -     lisinopril (PRINIVIL,ZESTRIL) 40 MG tablet; Take 1 tablet by mouth Daily.  Dispense: 90 tablet; Refill: 1  -     propranolol (INDERAL) 20 MG tablet; Take 1 tablet by mouth 3 (Three) Times a Day.  Dispense: 90 tablet; Refill: 2  -     CBC Auto Differential; Future  -     Comprehensive Metabolic Panel; Future  -     Lipid Panel; Future  -     Urinalysis With Culture If Indicated - Urine, Clean Catch; Future    2. Urine frequency  Assessment & Plan:  No longer an issues he is happy with medication     Orders:  -     tamsulosin (FLOMAX) 0.4 MG capsule 24 hr capsule; Take 1 capsule by mouth Daily.  Dispense: 30 capsule; Refill: 2            Follow Up   Return in about 6 months (around 5/6/2025).  Patient was given instructions and counseling regarding his condition or for health maintenance advice. Please see specific information pulled into the AVS if appropriate.

## 2024-11-07 ENCOUNTER — TELEPHONE (OUTPATIENT)
Dept: FAMILY MEDICINE CLINIC | Facility: CLINIC | Age: 64
End: 2024-11-07
Payer: COMMERCIAL

## 2024-11-07 NOTE — TELEPHONE ENCOUNTER
Sent pt [employer] message relaying below    HUB TO RELAY  Dr. Monte said your lab work was all normal. Please let me know if you have any questions.

## 2025-02-04 ENCOUNTER — OFFICE VISIT (OUTPATIENT)
Dept: FAMILY MEDICINE CLINIC | Facility: CLINIC | Age: 65
End: 2025-02-04
Payer: COMMERCIAL

## 2025-02-04 VITALS
BODY MASS INDEX: 33.18 KG/M2 | SYSTOLIC BLOOD PRESSURE: 149 MMHG | DIASTOLIC BLOOD PRESSURE: 79 MMHG | WEIGHT: 245 LBS | TEMPERATURE: 97.9 F | HEART RATE: 64 BPM | OXYGEN SATURATION: 94 % | HEIGHT: 72 IN | RESPIRATION RATE: 18 BRPM

## 2025-02-04 DIAGNOSIS — F20.0 PARANOID SCHIZOPHRENIA: ICD-10-CM

## 2025-02-04 DIAGNOSIS — R25.1 TREMOR: ICD-10-CM

## 2025-02-04 DIAGNOSIS — I10 ESSENTIAL HYPERTENSION: ICD-10-CM

## 2025-02-04 DIAGNOSIS — Z99.89 CPAP (CONTINUOUS POSITIVE AIRWAY PRESSURE) DEPENDENCE: Primary | ICD-10-CM

## 2025-02-04 DIAGNOSIS — K21.9 GASTROESOPHAGEAL REFLUX DISEASE WITHOUT ESOPHAGITIS: ICD-10-CM

## 2025-02-04 DIAGNOSIS — F41.9 ANXIETY: ICD-10-CM

## 2025-02-04 DIAGNOSIS — R35.0 URINE FREQUENCY: ICD-10-CM

## 2025-02-04 PROCEDURE — 99214 OFFICE O/P EST MOD 30 MIN: CPT | Performed by: PEDIATRICS

## 2025-02-04 PROCEDURE — 3078F DIAST BP <80 MM HG: CPT | Performed by: PEDIATRICS

## 2025-02-04 PROCEDURE — 3077F SYST BP >= 140 MM HG: CPT | Performed by: PEDIATRICS

## 2025-02-04 RX ORDER — PROPRANOLOL HCL 20 MG
20 TABLET ORAL 3 TIMES DAILY
Qty: 90 TABLET | Refills: 2 | Status: SHIPPED | OUTPATIENT
Start: 2025-02-04

## 2025-02-04 RX ORDER — FLUOXETINE 40 MG/1
40 CAPSULE ORAL EVERY MORNING
Qty: 30 CAPSULE | Refills: 2 | Status: SHIPPED | OUTPATIENT
Start: 2025-02-04

## 2025-02-04 RX ORDER — AMANTADINE HYDROCHLORIDE 100 MG/1
100 CAPSULE, GELATIN COATED ORAL 3 TIMES DAILY
Qty: 90 CAPSULE | Refills: 2 | Status: SHIPPED | OUTPATIENT
Start: 2025-02-04

## 2025-02-04 RX ORDER — PANTOPRAZOLE SODIUM 40 MG/1
40 TABLET, DELAYED RELEASE ORAL EVERY MORNING
Qty: 30 TABLET | Refills: 2 | Status: SHIPPED | OUTPATIENT
Start: 2025-02-04

## 2025-02-04 RX ORDER — LISINOPRIL 40 MG/1
40 TABLET ORAL DAILY
Qty: 90 TABLET | Refills: 1 | Status: SHIPPED | OUTPATIENT
Start: 2025-02-04

## 2025-02-04 RX ORDER — TAMSULOSIN HYDROCHLORIDE 0.4 MG/1
1 CAPSULE ORAL DAILY
Qty: 90 CAPSULE | Refills: 1 | Status: SHIPPED | OUTPATIENT
Start: 2025-02-04

## 2025-02-04 RX ORDER — PALIPERIDONE 6 MG/1
6 TABLET, EXTENDED RELEASE ORAL EVERY MORNING
Qty: 30 TABLET | Refills: 2 | Status: SHIPPED | OUTPATIENT
Start: 2025-02-04

## 2025-02-04 NOTE — PROGRESS NOTES
"Chief Complaint  Hypertension, Med Refill, and Mood    Subjective    History of Present Illness      Patient presents to [facility] for   History of Present Illness  Here for follow up and refills. Voices no concerns.   Hypertension         History of Present Illness  The patient is a 64-year-old male who presents for evaluation of diarrhea, dizziness, and medication refills.    He reports experiencing mild diarrhea, which he attributes to recent increased consumption of pineapple and milk.    He also mentions occasional episodes of dizziness every 2 to 3 months, characterized by a sensation of the room spinning. He speculates that these episodes may be related to improper mask placement during sleep, potentially leading to hypoxia. Upon awakening, he experiences a brief period of dizziness and vertigo, lasting approximately 5 minutes. He has had instances where food has entered his windpipe, necessitating coughing to clear the airway. To mitigate this risk, he has adjusted his diet to include softer foods.    He is seeking refills for his medications, which were previously filled in August 2024. He has been adhering to his prescribed regimen without any issues. He reports no hallucinations or other adverse effects from his medications.    MEDICATIONS  tamsulosin, Invega, Prozac, amantadine    Review of Systems    I have reviewed and agree with the HPI information as above.  Sebastián Monte MD     Objective   Vital Signs:   /79   Pulse 64   Temp 97.9 °F (36.6 °C)   Resp 18   Ht 182.9 cm (72\")   Wt 111 kg (245 lb)   SpO2 94%   BMI 33.23 kg/m²            Physical Exam  Constitutional:       Appearance: Normal appearance. He is normal weight.   Cardiovascular:      Rate and Rhythm: Normal rate and regular rhythm.      Heart sounds: Normal heart sounds.   Pulmonary:      Effort: Pulmonary effort is normal.      Breath sounds: Normal breath sounds.   Neurological:      Mental " Status: He is alert.   Psychiatric:         Mood and Affect: Mood and affect normal.         Speech: Speech normal.         Behavior: Behavior normal. Behavior is cooperative.         Thought Content: Thought content normal.         Cognition and Memory: Cognition normal.         Judgment: Judgment normal.          MARKUS-7:      PHQ-2 Depression Screening    Little interest or pleasure in doing things? Several days   Feeling down, depressed, or hopeless? Several days   PHQ-2 Total Score 2      PHQ-9 Depression Screening  Little interest or pleasure in doing things? Several days   Feeling down, depressed, or hopeless? Several days   PHQ-2 Total Score 2   Trouble falling or staying asleep, or sleeping too much? Not at all   Feeling tired or having little energy? Over half   Poor appetite or overeating? Not at all   Feeling bad about yourself - or that you are a failure or have let yourself or your family down? Not at all   Trouble concentrating on things, such as reading the newspaper or watching television? Not at all   Moving or speaking so slowly that other people could have noticed? Or the opposite - being so fidgety or restless that you have been moving around a lot more than usual? Not at all   Thoughts that you would be better off dead, or of hurting yourself in some way? Not at all   PHQ-9 Total Score 4   If you checked off any problems, how difficult have these problems made it for you to do your work, take care of things at home, or get along with other people? Somewhat difficult           Result Review  Data Reviewed:                   Assessment and Plan      Diagnoses and all orders for this visit:    1. CPAP (continuous positive airway pressure) dependence (Primary)  Assessment & Plan:  Doing well with machine   good compliance      2. Paranoid schizophrenia  Assessment & Plan:  Psychological condition is stable.  Continue current treatment regimen.  Psychological condition  will be reassessed in 3  months.    Orders:  -     paliperidone (INVEGA) 6 MG 24 hr tablet; Take 1 tablet by mouth Every Morning.  Dispense: 30 tablet; Refill: 2    3. Tremor  Assessment & Plan:  Doing well with machine   good compliance    Orders:  -     amantadine (SYMMETREL) 100 MG capsule; Take 1 capsule by mouth 3 (Three) Times a Day.  Dispense: 90 capsule; Refill: 2    4. Anxiety  -     FLUoxetine (PROzac) 20 MG capsule; Take 1 capsule by mouth Every Morning.  Dispense: 30 capsule; Refill: 2  -     FLUoxetine (PROzac) 40 MG capsule; Take 1 capsule by mouth Every Morning.  Dispense: 30 capsule; Refill: 2    5. Essential hypertension  -     lisinopril (PRINIVIL,ZESTRIL) 40 MG tablet; Take 1 tablet by mouth Daily.  Dispense: 90 tablet; Refill: 1  -     propranolol (INDERAL) 20 MG tablet; Take 1 tablet by mouth 3 (Three) Times a Day.  Dispense: 90 tablet; Refill: 2    6. Gastroesophageal reflux disease without esophagitis  -     pantoprazole (PROTONIX) 40 MG EC tablet; Take 1 tablet by mouth Every Morning.  Dispense: 30 tablet; Refill: 2    7. Urine frequency  -     tamsulosin (FLOMAX) 0.4 MG capsule 24 hr capsule; Take 1 capsule by mouth Daily.  Dispense: 90 capsule; Refill: 1        Assessment & Plan  1. Diarrhea.  The patient reports a touch of diarrhea, likely due to recent dietary changes, including increased consumption of pineapple and milk. He is advised to monitor his diet and reduce intake of these foods to see if symptoms improve.    2. Dizziness.  He experiences slight dizziness every two to three months, possibly related to improper use of his CPAP mask. He is advised to ensure proper mask fit and usage to prevent episodes of dizziness and room spinning.    3. Medication Management.  Prescriptions for tamsulosin, Invega, blood pressure medication, Prozac, and amantadine will be refilled. The prescriptions will be sent to [facility].    Follow-up  The patient will follow up in 3 months.        Follow Up   Return  in about 3 months (around 5/4/2025).  Patient was given instructions and counseling regarding his condition or for health maintenance advice. Please see specific information pulled into the AVS if appropriate.     Patient or patient representative verbalized consent for the use of Ambient Listening during the visit with  Sebastián Monte MD for chart documentation. 2/4/2025  13:57 CST

## 2025-03-27 NOTE — PROGRESS NOTES
[facility] - PODIATRY    Today's Date: 04/11/25    Patient Name: Ryan Osborn  MRN: [id number]  CSN: [id number]  PCP: Valerie Kelly APRN  Referring Provider: No ref. provider found    SUBJECTIVE     Chief Complaint   Patient presents with    Follow-up     Valerie Kelly APRN-02/04/2025-Onychomycosis  Pt here for fungus on toenails.     HPI: Ryan Osborn, a 65 y.o.male, comes to clinic as a(n) estbalished patient complaining of thick fungal toenails . Patient has h/o Schizophrenia, Bipolar 1 Disorder, Depression, Anxiety, HTN . Patient is non-diabetic.  Patient lives with his brother due to assistance with his care. Notes that his toenails were very long, thick, discolored and crumbly. Patient has difficulty caring for himself due to psychological issues. Admits pain at 3/10 level and described as aching and dull. Relates previous treatment(s) including foot care by podiatry . Denies any constitutional symptoms. No other pedal complaints at this time.    Past Medical History:   Diagnosis Date    Anxiety and depression     Bipolar 1 disorder     Depression     Difficulty walking     Diverticulitis of colon 1990s    Was advised after rupturing and surgery that they believe seeds cause diverticulitis. Needs updated dietary information    Hernia 1990s    Following surgery, Repair by Dina Gould 2000s    Hypertension     Irritable bowel syndrome     Possibly. Often has diarrhea or constipation. Doesn’t use Miralax as suggested and eats little fiber    Schizophrenia      Past Surgical History:   Procedure Laterality Date    ABDOMINAL SURGERY  1990s, 2000s    Emergency for peritonitis with temporary colostomy, Hernia repair by Dina Gould MD    APPENDECTOMY      COLONOSCOPY N/A 06/30/2023    Procedure: COLONOSCOPY WITH ANESTHESIA;  Surgeon: Xavier Stone DO;  Location: [facility];  Service: Gastroenterology;  Laterality: N/A;  Pre: History of colon polyps;  Post:  diverticulosis;  Sebastián Monte MD    ENDOSCOPY N/A 09/04/2020    Procedure: ESOPHAGOGASTRODUODENOSCOPY WITH ANESTHESIA;  Surgeon: Xavier Stone DO;  Location: [facility] ENDOSCOPY;  Service: Gastroenterology;  Laterality: N/A;  pre: painful swallowing  post; esophagitis  Sebastián Monte MD    FLEXIBLE SIGMOIDOSCOPY      Maybe    HERNIA REPAIR      UPPER GASTROINTESTINAL ENDOSCOPY       Family History   Problem Relation Age of Onset    Colon polyps Brother     Cancer Mother     Diabetes Mother     Hypertension Mother     Osteoporosis Mother     Cancer Father     Diabetes Father     Heart disease Father     Hypertension Father     Colon cancer Neg Hx     Esophageal cancer Neg Hx      Social History     Socioeconomic History    Marital status: Single   Tobacco Use    Smoking status: Never     Passive exposure: Never    Smokeless tobacco: Never   Vaping Use    Vaping status: Never Used   Substance and Sexual Activity    Alcohol use: Never    Drug use: Never    Sexual activity: Defer     Allergies   Allergen Reactions    Aripiprazole Other (See Comments)     Caused pacing     Current Outpatient Medications   Medication Sig Dispense Refill    amantadine (SYMMETREL) 100 MG capsule Take 1 capsule by mouth 3 (Three) Times a Day. 90 capsule 2    clonazePAM (KlonoPIN) 0.5 MG tablet Take 1 tablet by mouth 2 (Two) Times a Day As Needed.      FLUoxetine (PROzac) 20 MG capsule Take 1 capsule by mouth Every Morning. 30 capsule 2    FLUoxetine (PROzac) 40 MG capsule Take 1 capsule by mouth Every Morning. 30 capsule 2    lisinopril (PRINIVIL,ZESTRIL) 40 MG tablet Take 1 tablet by mouth Daily. 90 tablet 1    ondansetron ODT (ZOFRAN-ODT) 8 MG disintegrating tablet Place 1 tablet on the tongue Every 8 (Eight) Hours As Needed for Nausea or Vomiting. 20 tablet 0    paliperidone (INVEGA) 6 MG 24 hr tablet Take 1 tablet by mouth Every Morning. 30 tablet 2    pantoprazole (PROTONIX) 40 MG EC tablet Take 1 tablet by mouth Every Morning. 30  tablet 2    propranolol (INDERAL) 20 MG tablet Take 1 tablet by mouth 3 (Three) Times a Day. 90 tablet 2    tamsulosin (FLOMAX) 0.4 MG capsule 24 hr capsule Take 1 capsule by mouth Daily. 90 capsule 1     No current facility-administered medications for this visit.     Review of Systems   Constitutional:  Negative for chills and fever.   HENT:  Negative for congestion.    Respiratory:  Negative for shortness of breath.    Cardiovascular:  Negative for chest pain and leg swelling.   Gastrointestinal:  Negative for constipation, diarrhea, nausea and vomiting.   Musculoskeletal:         Foot pain   Skin:  Negative for wound.   Neurological:  Negative for numbness.       OBJECTIVE     Vitals:    04/11/25 0921   BP: 132/86   Pulse: 72   SpO2: 96%         PHYSICAL EXAM  GEN:   Accompanied by brother.     Foot/Ankle Exam    GENERAL  Appearance:  appears stated age  Orientation:  AAOx3  Gait:  unimpaired  Assistance:  independent  Right shoe gear: casual shoe  Left shoe gear: casual shoe    VASCULAR     Right Foot Vascularity   Dorsalis pedis:  2+  Posterior tibial:  2+  Skin temperature:  warm  Edema grading:  None  CFT:  3  Pedal hair growth:  Present  Varicosities:  none     Left Foot Vascularity   Dorsalis pedis:  2+  Posterior tibial:  2+  Skin temperature:  warm  Edema grading:  None  CFT:  3  Pedal hair growth:  Present  Varicosities:  none     NEUROLOGIC     Right Foot Neurologic   Normal sensation    Light touch sensation: normal  Vibratory sensation: normal  Hot/Cold sensation: normal  Protective Sensation using [place]-Marlen Monofilament:   Sites intact: 10  Sites tested: 10     Left Foot Neurologic   Normal sensation    Light touch sensation: normal  Vibratory sensation: normal  Hot/Cold sensation:  normal  Protective Sensation using [place]-Marlen Monofilament:   Sites intact: 10  Sites tested: 10    MUSCULOSKELETAL     Right Foot Musculoskeletal   Ecchymosis:  none  Tenderness:  toenail problem    Arch:   Normal     Left Foot Musculoskeletal   Ecchymosis:  none  Tenderness:  toenail problem  Arch:  Normal    MUSCLE STRENGTH     Right Foot Muscle Strength   Foot dorsiflexion:  5  Foot plantar flexion:  5  Foot inversion:  5  Foot eversion:  5     Left Foot Muscle Strength   Foot dorsiflexion:  5  Foot plantar flexion:  5  Foot inversion:  5  Foot eversion:  5    RANGE OF MOTION     Right Foot Range of Motion   Foot and ankle ROM within normal limits       Left Foot Range of Motion   Foot and ankle ROM within normal limits      DERMATOLOGIC      Right Foot Dermatologic   Skin  Right foot skin is intact.   Nails  1.  Positive for elongated, onychomycosis, abnormal thickness, subungual debris and dystrophic nail.  2.  Absent.  3.  Positive for elongated, onychomycosis, abnormal thickness, subungual debris and dystrophic nail.  4.  Positive for elongated, onychomycosis, abnormal thickness, subungual debris and dystrophic nail.  5.  Positive for elongated, onychomycosis, abnormal thickness, subungual debris and dystrophic nail.     Left Foot Dermatologic   Skin  Left foot skin is intact.   Nails  1.  Positive for elongated, onychomycosis, abnormal thickness, subungual debris and dystrophic nail.  2.  Positive for elongated, onychomycosis, abnormal thickness, subungual debris and dystrophic nail.  3.  Positive for elongated, onychomycosis, abnormal thickness, subungual debris and dystrophic nail.  4.  Positive for elongated, onychomycosis, abnormally thick, subungual debris and dystrophic nail.  5.  Positive for elongated, onychomycosis, abnormally thick, subungual debris and dystrophic nail.      RADIOLOGY/NUCLEAR:  No results found.    LABORATORY/CULTURE RESULTS:      PATHOLOGY RESULTS:       ASSESSMENT/PLAN     Diagnoses and all orders for this visit:    1. Onychomycosis (Primary)    2. Onychogryphosis    3. Pain around toenail    4. Mental health disorder    5. Self-care deficit for hygiene        Comprehensive lower  extremity examination and evaluation was performed.  Discussed findings and treatment plan including risks, benefits, and treatment options with patient in detail. Patient agreed with treatment plan.  After verbal consent obtained, nail(s) x10 debrided of length and thickness with nail nipper without incidence  Patient may maintain nails and calluses at home utilizing emery board or pumice stone between visits as needed   Advised patient to check with insurance regarding coverage for ongoing nail care.  An After Visit Summary was printed and given to the patient at discharge, including (if requested) any available informative/educational handouts regarding diagnosis, treatment, or medications. All questions were answered to patient/family satisfaction. Should symptoms fail to improve or worsen they agree to call or return to clinic or to go to the Emergency Department. Discussed the importance of following up with any needed screening tests/labs/specialist appointments and any requested follow-up recommended by me today. Importance of maintaining follow-up discussed and patient accepts that missed appointments can delay diagnosis and potentially lead to worsening of conditions.  Return if symptoms worsen or fail to improve, for Follow-up with Podiatry APRN., or sooner if acute issues arise.        This document has been electronically signed by Wilmer Mcclain DPM on April 11, 2025 10:11 CDT

## 2025-04-11 ENCOUNTER — OFFICE VISIT (OUTPATIENT)
Age: 65
End: 2025-04-11
Payer: COMMERCIAL

## 2025-04-11 VITALS
WEIGHT: 245 LBS | HEART RATE: 72 BPM | OXYGEN SATURATION: 96 % | HEIGHT: 72 IN | DIASTOLIC BLOOD PRESSURE: 86 MMHG | BODY MASS INDEX: 33.18 KG/M2 | SYSTOLIC BLOOD PRESSURE: 132 MMHG

## 2025-04-11 DIAGNOSIS — L60.2 ONYCHOGRYPHOSIS: ICD-10-CM

## 2025-04-11 DIAGNOSIS — F99 MENTAL HEALTH DISORDER: ICD-10-CM

## 2025-04-11 DIAGNOSIS — Z74.1 SELF-CARE DEFICIT FOR HYGIENE: ICD-10-CM

## 2025-04-11 DIAGNOSIS — B35.1 ONYCHOMYCOSIS: Primary | ICD-10-CM

## 2025-04-11 DIAGNOSIS — M79.676 PAIN AROUND TOENAIL: ICD-10-CM

## 2025-04-19 DIAGNOSIS — R35.0 URINE FREQUENCY: ICD-10-CM

## 2025-04-21 RX ORDER — TAMSULOSIN HYDROCHLORIDE 0.4 MG/1
1 CAPSULE ORAL DAILY
Qty: 90 CAPSULE | Refills: 1 | OUTPATIENT
Start: 2025-04-21

## 2025-04-26 DIAGNOSIS — R35.0 URINE FREQUENCY: ICD-10-CM

## 2025-04-28 RX ORDER — TAMSULOSIN HYDROCHLORIDE 0.4 MG/1
1 CAPSULE ORAL DAILY
Qty: 90 CAPSULE | Refills: 1 | Status: SHIPPED | OUTPATIENT
Start: 2025-04-28

## 2025-04-28 NOTE — TELEPHONE ENCOUNTER
Rx Refill Note  Requested Prescriptions     Pending Prescriptions Disp Refills    tamsulosin (FLOMAX) 0.4 MG capsule 24 hr capsule [Pharmacy Med Name: tamsulosin 0.4 mg capsule] 90 capsule 1     Sig: Take 1 capsule by mouth Daily.      Last office visit with prescribing clinician: 2/4/2025   Last telemedicine visit with prescribing clinician: Visit date not found   Next office visit with prescribing clinician: Visit date not found      Deepthi Conte MA  04/28/25, 08:37 CDT   Pt currently on bedrest. Updated activity orders are required to cont with OT evaluation. OT to cont when appropriate.

## 2025-05-06 ENCOUNTER — LAB (OUTPATIENT)
Dept: LAB | Facility: HOSPITAL | Age: 65
End: 2025-05-06
Payer: MEDICARE

## 2025-05-06 ENCOUNTER — OFFICE VISIT (OUTPATIENT)
Dept: FAMILY MEDICINE CLINIC | Facility: CLINIC | Age: 65
End: 2025-05-06
Payer: MEDICARE

## 2025-05-06 VITALS
HEART RATE: 83 BPM | BODY MASS INDEX: 32.91 KG/M2 | WEIGHT: 243 LBS | DIASTOLIC BLOOD PRESSURE: 86 MMHG | RESPIRATION RATE: 20 BRPM | SYSTOLIC BLOOD PRESSURE: 136 MMHG | HEIGHT: 72 IN

## 2025-05-06 DIAGNOSIS — Z00.00 MEDICARE ANNUAL WELLNESS VISIT, SUBSEQUENT: ICD-10-CM

## 2025-05-06 DIAGNOSIS — Z12.5 SCREENING PSA (PROSTATE SPECIFIC ANTIGEN): ICD-10-CM

## 2025-05-06 DIAGNOSIS — R73.9 HYPERGLYCEMIA: ICD-10-CM

## 2025-05-06 DIAGNOSIS — F41.9 ANXIETY: ICD-10-CM

## 2025-05-06 DIAGNOSIS — F31.9 BIPOLAR AFFECTIVE DISORDER, REMISSION STATUS UNSPECIFIED: ICD-10-CM

## 2025-05-06 DIAGNOSIS — I10 ESSENTIAL HYPERTENSION: Primary | ICD-10-CM

## 2025-05-06 DIAGNOSIS — E55.9 VITAMIN D DEFICIENCY: ICD-10-CM

## 2025-05-06 DIAGNOSIS — F20.0 PARANOID SCHIZOPHRENIA: ICD-10-CM

## 2025-05-06 DIAGNOSIS — R73.9 HYPERGLYCEMIA: Primary | ICD-10-CM

## 2025-05-06 DIAGNOSIS — R25.1 TREMOR: ICD-10-CM

## 2025-05-06 DIAGNOSIS — K21.9 GASTROESOPHAGEAL REFLUX DISEASE WITHOUT ESOPHAGITIS: ICD-10-CM

## 2025-05-06 DIAGNOSIS — I10 ESSENTIAL HYPERTENSION: ICD-10-CM

## 2025-05-06 DIAGNOSIS — R35.0 URINE FREQUENCY: ICD-10-CM

## 2025-05-06 LAB
ALBUMIN SERPL-MCNC: 4.2 G/DL (ref 3.5–5)
ALBUMIN/GLOB SERPL: 1.4 G/DL (ref 1.1–2.5)
ALP SERPL-CCNC: 94 U/L (ref 24–120)
ALT SERPL W P-5'-P-CCNC: 32 U/L (ref 0–50)
ANION GAP SERPL CALCULATED.3IONS-SCNC: 8 MMOL/L (ref 4–13)
AST SERPL-CCNC: 29 U/L (ref 7–45)
AUTO MIXED CELLS #: 1.1 10*3/MM3 (ref 0.1–2.6)
AUTO MIXED CELLS %: 18 % (ref 0.1–24)
BILIRUB SERPL-MCNC: 0.3 MG/DL (ref 0.1–1)
BILIRUB UR QL STRIP: NEGATIVE
BUN SERPL-MCNC: 12 MG/DL (ref 5–21)
BUN/CREAT SERPL: 13.3
CALCIUM SPEC-SCNC: 8.8 MG/DL (ref 8.6–10.5)
CHLORIDE SERPL-SCNC: 103 MMOL/L (ref 98–110)
CHOLEST SERPL-MCNC: 153 MG/DL (ref 130–200)
CLARITY UR: CLEAR
CO2 SERPL-SCNC: 25 MMOL/L (ref 24–31)
COLOR UR: YELLOW
CREAT SERPL-MCNC: 0.9 MG/DL (ref 0.5–1.4)
EGFRCR SERPLBLD CKD-EPI 2021: 94.8 ML/MIN/1.73
ERYTHROCYTE [DISTWIDTH] IN BLOOD BY AUTOMATED COUNT: 12.5 % (ref 12.3–15.4)
GLOBULIN UR ELPH-MCNC: 3.1 GM/DL
GLUCOSE SERPL-MCNC: 158 MG/DL (ref 65–99)
GLUCOSE UR STRIP-MCNC: NEGATIVE MG/DL
HBA1C MFR BLD: 5.8 % (ref 4.8–5.9)
HCT VFR BLD AUTO: 37.8 % (ref 37.5–51)
HDLC SERPL-MCNC: 42 MG/DL
HGB BLD-MCNC: 13.1 G/DL (ref 13–17.7)
HGB UR QL STRIP.AUTO: NEGATIVE
KETONES UR QL STRIP: NEGATIVE
LDLC SERPL CALC-MCNC: 84 MG/DL (ref 0–99)
LDLC/HDLC SERPL: 1.9 {RATIO}
LEUKOCYTE ESTERASE UR QL STRIP.AUTO: NEGATIVE
LYMPHOCYTES # BLD AUTO: 2.8 10*3/MM3 (ref 0.7–3.1)
LYMPHOCYTES NFR BLD AUTO: 46.9 % (ref 19.6–45.3)
MCH RBC QN AUTO: 31.2 PG (ref 26.6–33)
MCHC RBC AUTO-ENTMCNC: 34.7 G/DL (ref 31.5–35.7)
MCV RBC AUTO: 90 FL (ref 79–97)
NEUTROPHILS NFR BLD AUTO: 2 10*3/MM3 (ref 1.7–7)
NEUTROPHILS NFR BLD AUTO: 35.1 % (ref 42.7–76)
NITRITE UR QL STRIP: NEGATIVE
PH UR STRIP.AUTO: 6 [PH] (ref 5–8)
PLATELET # BLD AUTO: 306 10*3/MM3 (ref 140–450)
PMV BLD AUTO: 7.9 FL (ref 6–12)
POTASSIUM SERPL-SCNC: 3.8 MMOL/L (ref 3.5–5.3)
PROT SERPL-MCNC: 7.3 G/DL (ref 6.3–8.7)
PROT UR QL STRIP: NEGATIVE
RBC # BLD AUTO: 4.2 10*6/MM3 (ref 4.14–5.8)
SODIUM SERPL-SCNC: 136 MMOL/L (ref 135–145)
SP GR UR STRIP: 1.01 (ref 1–1.03)
TRIGL SERPL-MCNC: 155 MG/DL (ref 0–149)
UROBILINOGEN UR QL STRIP: NORMAL
VLDLC SERPL-MCNC: 27 MG/DL (ref 5–40)
WBC NRBC COR # BLD AUTO: 5.9 10*3/MM3 (ref 3.4–10.8)

## 2025-05-06 PROCEDURE — G0103 PSA SCREENING: HCPCS

## 2025-05-06 PROCEDURE — 36415 COLL VENOUS BLD VENIPUNCTURE: CPT

## 2025-05-06 PROCEDURE — 84443 ASSAY THYROID STIM HORMONE: CPT

## 2025-05-06 PROCEDURE — 82306 VITAMIN D 25 HYDROXY: CPT

## 2025-05-06 PROCEDURE — 85025 COMPLETE CBC W/AUTO DIFF WBC: CPT

## 2025-05-06 PROCEDURE — 80061 LIPID PANEL: CPT

## 2025-05-06 PROCEDURE — 82043 UR ALBUMIN QUANTITATIVE: CPT

## 2025-05-06 PROCEDURE — 80053 COMPREHEN METABOLIC PANEL: CPT

## 2025-05-06 PROCEDURE — 81003 URINALYSIS AUTO W/O SCOPE: CPT

## 2025-05-06 PROCEDURE — 83036 HEMOGLOBIN GLYCOSYLATED A1C: CPT

## 2025-05-06 PROCEDURE — 82570 ASSAY OF URINE CREATININE: CPT

## 2025-05-06 RX ORDER — AMANTADINE HYDROCHLORIDE 100 MG/1
100 CAPSULE, GELATIN COATED ORAL 3 TIMES DAILY
Qty: 90 CAPSULE | Refills: 2 | Status: SHIPPED | OUTPATIENT
Start: 2025-05-06

## 2025-05-06 RX ORDER — PANTOPRAZOLE SODIUM 40 MG/1
40 TABLET, DELAYED RELEASE ORAL EVERY MORNING
Qty: 90 TABLET | Refills: 1 | Status: SHIPPED | OUTPATIENT
Start: 2025-05-06

## 2025-05-06 RX ORDER — LISINOPRIL 40 MG/1
40 TABLET ORAL DAILY
Qty: 90 TABLET | Refills: 1 | Status: SHIPPED | OUTPATIENT
Start: 2025-05-06

## 2025-05-06 RX ORDER — FLUOXETINE HYDROCHLORIDE 40 MG/1
40 CAPSULE ORAL EVERY MORNING
Qty: 30 CAPSULE | Refills: 2 | Status: SHIPPED | OUTPATIENT
Start: 2025-05-06

## 2025-05-06 RX ORDER — PALIPERIDONE 6 MG/1
6 TABLET, EXTENDED RELEASE ORAL EVERY MORNING
Qty: 30 TABLET | Refills: 2 | Status: SHIPPED | OUTPATIENT
Start: 2025-05-06

## 2025-05-06 RX ORDER — PROPRANOLOL HCL 20 MG
20 TABLET ORAL 3 TIMES DAILY
Qty: 90 TABLET | Refills: 2 | Status: SHIPPED | OUTPATIENT
Start: 2025-05-06

## 2025-05-06 RX ORDER — TAMSULOSIN HYDROCHLORIDE 0.4 MG/1
1 CAPSULE ORAL DAILY
Qty: 90 CAPSULE | Refills: 1 | Status: SHIPPED | OUTPATIENT
Start: 2025-05-06

## 2025-05-06 NOTE — PROGRESS NOTES
Subjective   The ABCs of the Annual Wellness Visit  Medicare Wellness Visit      Ryan Osborn is a 65 y.o. patient who presents for a Medicare Wellness Visit.    The following portions of the patient's history were reviewed and   updated as appropriate: allergies, current medications, past family history, past medical history, past social history, past surgical history, and problem list.    Compared to one year ago, the patient's physical   health is the same.  Compared to one year ago, the patient's mental   health is the same.    Recent Hospitalizations:  He was not admitted to the hospital during the last year.     Current Medical Providers:  Patient Care Team:  Valerie Kelly APRN as PCP - General (Nurse Practitioner)  Xavier Stone DO as Consulting Physician (Gastroenterology)    Outpatient Medications Prior to Visit   Medication Sig Dispense Refill    clonazePAM (KlonoPIN) 0.5 MG tablet Take 1 tablet by mouth 2 (Two) Times a Day As Needed.      ondansetron ODT (ZOFRAN-ODT) 8 MG disintegrating tablet Place 1 tablet on the tongue Every 8 (Eight) Hours As Needed for Nausea or Vomiting. 20 tablet 0    amantadine (SYMMETREL) 100 MG capsule Take 1 capsule by mouth 3 (Three) Times a Day. 90 capsule 2    FLUoxetine (PROzac) 20 MG capsule Take 1 capsule by mouth Every Morning. 30 capsule 2    FLUoxetine (PROzac) 40 MG capsule Take 1 capsule by mouth Every Morning. 30 capsule 2    lisinopril (PRINIVIL,ZESTRIL) 40 MG tablet Take 1 tablet by mouth Daily. 90 tablet 1    paliperidone (INVEGA) 6 MG 24 hr tablet Take 1 tablet by mouth Every Morning. 30 tablet 2    pantoprazole (PROTONIX) 40 MG EC tablet Take 1 tablet by mouth Every Morning. 30 tablet 2    propranolol (INDERAL) 20 MG tablet Take 1 tablet by mouth 3 (Three) Times a Day. 90 tablet 2    tamsulosin (FLOMAX) 0.4 MG capsule 24 hr capsule Take 1 capsule by mouth Daily. 90 capsule 1     No facility-administered medications prior to visit.     No  "opioid medication identified on active medication list. I have reviewed chart for other potential  high risk medication/s and harmful drug interactions in the elderly.      Aspirin is not on active medication list.  Aspirin use is not indicated based on review of current medical condition/s. Risk of harm outweighs potential benefits.  .    Patient Active Problem List   Diagnosis    Odynophagia    Abnormal brain scan    Akathisia    Altered mental status    Anxiety    CPAP (continuous positive airway pressure) dependence    Fatigue    Essential hypertension    Insomnia    Memory loss    Bipolar disorder    Nocturnal enuresis    Obstructive sleep apnea    Paranoid schizophrenia    PTSD (post-traumatic stress disorder)    Tremor    Vitamin D deficiency    Class 1 obesity with body mass index (BMI) of 30.0 to 30.9 in adult    Hyponatremia    History of colon polyps    Urine frequency    Gastroesophageal reflux disease without esophagitis     Advance Care Planning Advance Directive is not on file.  ACP discussion was held with the patient during this visit. Patient has an advance directive (not in EMR), copy requested.            Objective   Vitals:    05/06/25 1306 05/06/25 1419   BP: 159/91 136/86   Pulse: 83    Resp: 20    Weight: 110 kg (243 lb)    Height: 182.9 cm (72\")        Estimated body mass index is 32.96 kg/m² as calculated from the following:    Height as of this encounter: 182.9 cm (72\").    Weight as of this encounter: 110 kg (243 lb).                Does the patient have evidence of cognitive impairment? Yes  Lab Results   Component Value Date    TRIG 155 (H) 05/06/2025    HDL 42 05/06/2025    LDL 84 05/06/2025    VLDL 27 05/06/2025                                                                                                Health  Risk Assessment    Smoking Status:  Social History     Tobacco Use   Smoking Status Never    Passive exposure: Never   Smokeless Tobacco Never     Alcohol " Consumption:  Social History     Substance and Sexual Activity   Alcohol Use Never       Fall Risk Screen  STEADI Fall Risk Assessment has not been completed.    Depression Screening   Little interest or pleasure in doing things? Not at all   Feeling down, depressed, or hopeless? Not at all   PHQ-2 Total Score 0      Health Habits and Functional and Cognitive Screenin/6/2025     2:00 PM   Functional & Cognitive Status   Do you have difficulty preparing food and eating? Yes   Do you have difficulty bathing yourself, getting dressed or grooming yourself? Yes   Do you have difficulty using the toilet? No   Do you have difficulty moving around from place to place? No   Do you have trouble with steps or getting out of a bed or a chair? No   Do you need help using the phone?  Yes   Are you deaf or do you have serious difficulty hearing?  No   Do you need help to go to places out of walking distance? No   Do you need help shopping? Yes   Do you need help preparing meals?  Yes   Do you need help with housework?  Yes   Do you need help with laundry? Yes   Do you need help taking your medications? Yes   Do you need help managing money? Yes   Do you ever drive or ride in a car without wearing a seat belt? Yes   Have you felt unusual stress, anger or loneliness in the last month? No   Who do you live with? Sibling   If you need help, do you have trouble finding someone available to you? No   Have you been bothered in the last four weeks by sexual problems? No   Do you have difficulty concentrating, remembering or making decisions? Yes           Age-appropriate Screening Schedule:  Refer to the list below for future screening recommendations based on patient's age, sex and/or medical conditions. Orders for these recommended tests are listed in the plan section. The patient has been provided with a written plan.    Health Maintenance List  Health Maintenance   Topic Date Due    TDAP/TD VACCINES (1 - Tdap) Never done     "ZOSTER VACCINE (1 of 2) Never done    COVID-19 Vaccine (3 - 2024-25 season) 09/01/2024    Pneumococcal Vaccine 50+ (1 of 1 - PCV) 11/02/2025 (Originally 4/7/2010)    INFLUENZA VACCINE  07/01/2025    ANNUAL WELLNESS VISIT  05/06/2026    COLORECTAL CANCER SCREENING  06/30/2028    HEPATITIS C SCREENING  Discontinued                                                                                                                                                CMS Preventative Services Quick Reference  Risk Factors Identified During Encounter  None Identified    The above risks/problems have been discussed with the patient.  Pertinent information has been shared with the patient in the After Visit Summary.  An After Visit Summary and PPPS were made available to the patient.    Follow Up:   Next Medicare Wellness visit to be scheduled in 1 year.         Additional E&M Note during same encounter follows:  Patient has additional, significant, and separately identifiable condition(s)/problem(s) that require work above and beyond the Medicare Wellness Visit     Chief Complaint  Anxiety, Hypertension, and Medicare Exam    Subjective    Anxiety  Hypertension  Associated symptoms: anxiety          History of Present Illness                    Objective   Vital Signs:  /86   Pulse 83   Resp 20   Ht 182.9 cm (72\")   Wt 110 kg (243 lb)   BMI 32.96 kg/m²   Physical Exam  Vitals and nursing note reviewed.   Constitutional:       Appearance: Normal appearance. He is well-developed. He is obese.   HENT:      Head: Normocephalic and atraumatic.      Right Ear: Tympanic membrane, ear canal and external ear normal.      Left Ear: Tympanic membrane, ear canal and external ear normal.      Nose: Nose normal. No septal deviation, nasal tenderness or congestion.      Mouth/Throat:      Lips: Pink. No lesions.      Mouth: Mucous membranes are moist. No oral lesions.      Dentition: Normal dentition.      Pharynx: Oropharynx is clear. " No pharyngeal swelling, oropharyngeal exudate or posterior oropharyngeal erythema.   Eyes:      General: Lids are normal. Vision grossly intact. No scleral icterus.        Right eye: No discharge.         Left eye: No discharge.      Extraocular Movements: Extraocular movements intact.      Conjunctiva/sclera: Conjunctivae normal.      Right eye: Right conjunctiva is not injected.      Left eye: Left conjunctiva is not injected.      Pupils: Pupils are equal, round, and reactive to light.   Neck:      Thyroid: No thyroid mass.      Trachea: Trachea normal.   Cardiovascular:      Rate and Rhythm: Normal rate and regular rhythm.      Heart sounds: Normal heart sounds. No murmur heard.     No gallop.   Pulmonary:      Effort: Pulmonary effort is normal.      Breath sounds: Normal breath sounds and air entry. No wheezing, rhonchi or rales.   Musculoskeletal:         General: No tenderness or deformity. Normal range of motion.      Cervical back: Full passive range of motion without pain, normal range of motion and neck supple.      Thoracic back: Normal.      Right lower leg: No edema.      Left lower leg: No edema.   Skin:     General: Skin is warm and dry.      Coloration: Skin is not jaundiced.      Findings: No rash.   Neurological:      Mental Status: He is alert and oriented to person, place, and time. Mental status is at baseline.      Sensory: Sensation is intact.      Motor: Motor function is intact.      Coordination: Coordination is intact.      Gait: Gait is intact.      Deep Tendon Reflexes: Reflexes are normal and symmetric.   Psychiatric:         Mood and Affect: Mood and affect normal.         Behavior: Behavior normal.         Judgment: Judgment normal.        Physical Exam              The following data was reviewed by: ABBY Gill on 05/06/2025:    Common labs          11/6/2024    13:41 5/6/2025    14:01   Common Labs   Glucose 117  158    BUN 10  12    Creatinine 1.10  0.90    Sodium  136  136    Potassium 4.2  3.8    Chloride 100  103    Calcium 8.9  8.8    Albumin 4.1  4.2    Total Bilirubin 0.2  0.3    Alkaline Phosphatase 103  94    AST (SGOT) 27  29    ALT (SGPT) 28  32    WBC 6.20  5.90    Hemoglobin 12.2  13.1    Hematocrit 35.4  37.8    Platelets 279  306    Total Cholesterol 166  153    Triglycerides 239  155    HDL Cholesterol 40  42    LDL Cholesterol  86  84        Results                   Assessment and Plan Additional age appropriate preventative wellness advice topics were discussed during today's preventative wellness exam(some topics already addressed during AWV portion of the note above):    Physical Activity: Advised cardiovascular activity 150 minutes per week as tolerated. (example brisk walk for 30 minutes, 5 days a week).     Nutrition: Discussed nutrition plan with patient. Information shared in after visit summary. Goal is for a well balanced diet to enhance overall health.     Healthy Weight: Discussed current and goal BMI with patient. Steps to attain this goal discussed. Information shared in after visit summary.          Assessment & Plan  Essential hypertension    Bipolar affective disorder, remission status unspecified    Vitamin D deficiency    Screening PSA (prostate specific antigen)    Medicare annual wellness visit, subsequent    Tremor    Anxiety    Urine frequency    Gastroesophageal reflux disease without esophagitis    Paranoid schizophrenia              Patient states that he is doing well. His nephew is in the waiting room at this time. Denies any concerns.   Agreed to labs today         Follow Up   Return in about 3 months (around 8/6/2025) for Recheck, Mood follow up.  Patient was given instructions and counseling regarding his condition or for health maintenance advice. Please see specific information pulled into the AVS if appropriate.

## 2025-05-07 LAB
25(OH)D3 SERPL-MCNC: 32.5 NG/ML (ref 30–100)
ALBUMIN UR-MCNC: <1.2 MG/DL
CREAT UR-MCNC: 71.9 MG/DL
MICROALBUMIN/CREAT UR: NORMAL MG/G{CREAT}
PSA SERPL-MCNC: 0.3 NG/ML (ref 0–4)
TSH SERPL DL<=0.05 MIU/L-ACNC: 1.58 UIU/ML (ref 0.27–4.2)

## 2025-08-12 ENCOUNTER — OFFICE VISIT (OUTPATIENT)
Dept: FAMILY MEDICINE CLINIC | Facility: CLINIC | Age: 65
End: 2025-08-12
Payer: MEDICARE

## 2025-08-12 VITALS
BODY MASS INDEX: 33.1 KG/M2 | DIASTOLIC BLOOD PRESSURE: 85 MMHG | RESPIRATION RATE: 22 BRPM | OXYGEN SATURATION: 94 % | TEMPERATURE: 98 F | HEIGHT: 72 IN | SYSTOLIC BLOOD PRESSURE: 126 MMHG | HEART RATE: 70 BPM | WEIGHT: 244.4 LBS

## 2025-08-12 DIAGNOSIS — F41.9 ANXIETY: ICD-10-CM

## 2025-08-12 DIAGNOSIS — I10 ESSENTIAL HYPERTENSION: Primary | ICD-10-CM

## 2025-08-12 RX ORDER — LISINOPRIL 40 MG/1
40 TABLET ORAL DAILY
Qty: 90 TABLET | Refills: 1 | Status: SHIPPED | OUTPATIENT
Start: 2025-08-12

## 2025-08-12 RX ORDER — FLUOXETINE HYDROCHLORIDE 40 MG/1
40 CAPSULE ORAL EVERY MORNING
Qty: 30 CAPSULE | Refills: 2 | Status: SHIPPED | OUTPATIENT
Start: 2025-08-12

## 2025-08-14 ENCOUNTER — REFERRAL TRIAGE (OUTPATIENT)
Age: 65
End: 2025-08-14
Payer: MEDICARE

## 2025-08-19 ENCOUNTER — PATIENT OUTREACH (OUTPATIENT)
Age: 65
End: 2025-08-19
Payer: MEDICARE

## 2025-08-21 ENCOUNTER — PATIENT OUTREACH (OUTPATIENT)
Age: 65
End: 2025-08-21
Payer: MEDICARE

## (undated) DEVICE — YANKAUER,BULB TIP WITH VENT: Brand: ARGYLE

## (undated) DEVICE — Device: Brand: DEFENDO AIR/WATER/SUCTION AND BIOPSY VALVE

## (undated) DEVICE — TBG SMPL FLTR LINE NASL 02/C02 A/ BX/100

## (undated) DEVICE — CUFF,BP,DISP,1 TUBE,ADULT,HP: Brand: MEDLINE

## (undated) DEVICE — FRCP BX RADJAW4 NDL 2.8 240 STD OG

## (undated) DEVICE — SENSR O2 OXIMAX FNGR A/ 18IN NONSTR

## (undated) DEVICE — CONMED SCOPE SAVER BITE BLOCK, 20X27 MM: Brand: SCOPE SAVER

## (undated) DEVICE — THE CHANNEL CLEANING BRUSH IS A NYLON FLEXI BRUSH ATTACHED TO A FLEXIBLE PLASTIC SHEATH DESIGNED TO SAFELY REMOVE DEBRIS FROM FLEXIBLE ENDOSCOPES.